# Patient Record
Sex: MALE | Race: OTHER | NOT HISPANIC OR LATINO | URBAN - METROPOLITAN AREA
[De-identification: names, ages, dates, MRNs, and addresses within clinical notes are randomized per-mention and may not be internally consistent; named-entity substitution may affect disease eponyms.]

---

## 2017-09-02 ENCOUNTER — EMERGENCY (EMERGENCY)
Facility: HOSPITAL | Age: 72
LOS: 0 days | Discharge: HOME | End: 2017-09-02
Attending: EMERGENCY MEDICINE
Payer: COMMERCIAL

## 2017-09-02 VITALS
HEART RATE: 81 BPM | RESPIRATION RATE: 20 BRPM | SYSTOLIC BLOOD PRESSURE: 130 MMHG | HEIGHT: 70 IN | WEIGHT: 132.06 LBS | OXYGEN SATURATION: 98 % | DIASTOLIC BLOOD PRESSURE: 82 MMHG | TEMPERATURE: 98 F

## 2017-09-02 DIAGNOSIS — Z90.49 ACQUIRED ABSENCE OF OTHER SPECIFIED PARTS OF DIGESTIVE TRACT: Chronic | ICD-10-CM

## 2017-09-02 PROCEDURE — 99283 EMERGENCY DEPT VISIT LOW MDM: CPT

## 2017-09-02 RX ORDER — ACETAMINOPHEN 500 MG
650 TABLET ORAL ONCE
Qty: 0 | Refills: 0 | Status: COMPLETED | OUTPATIENT
Start: 2017-09-02 | End: 2017-09-02

## 2017-09-02 RX ORDER — BACLOFEN 100 %
1 POWDER (GRAM) MISCELLANEOUS
Qty: 20 | Refills: 0
Start: 2017-09-02 | End: 2017-09-12

## 2017-09-02 RX ADMIN — Medication 650 MILLIGRAM(S): at 16:32

## 2017-09-02 NOTE — ED PROVIDER NOTE - MUSCULOSKELETAL, MLM
Spine appears normal, range of motion is not limited, + bilateral paravertebral mid lumbar and distal thoracic tenderness
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
## 2017-09-03 DIAGNOSIS — M54.5 LOW BACK PAIN: ICD-10-CM
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
## 2017-09-03 DIAGNOSIS — S46.911A STRAIN OF UNSPECIFIED MUSCLE, FASCIA AND TENDON AT SHOULDER AND UPPER ARM LEVEL, RIGHT ARM, INITIAL ENCOUNTER: ICD-10-CM

## 2017-09-03 DIAGNOSIS — S39.012A STRAIN OF MUSCLE, FASCIA AND TENDON OF LOWER BACK, INITIAL ENCOUNTER: ICD-10-CM

## 2017-09-03 DIAGNOSIS — S46.912A STRAIN OF UNSPECIFIED MUSCLE, FASCIA AND TENDON AT SHOULDER AND UPPER ARM LEVEL, LEFT ARM, INITIAL ENCOUNTER: ICD-10-CM

## 2018-02-24 ENCOUNTER — EMERGENCY (EMERGENCY)
Facility: HOSPITAL | Age: 73
LOS: 1 days | Discharge: HOME | End: 2018-02-24
Attending: EMERGENCY MEDICINE | Admitting: EMERGENCY MEDICINE
Payer: MEDICARE

## 2018-02-24 VITALS
OXYGEN SATURATION: 100 % | TEMPERATURE: 98 F | HEART RATE: 81 BPM | DIASTOLIC BLOOD PRESSURE: 75 MMHG | RESPIRATION RATE: 17 BRPM | SYSTOLIC BLOOD PRESSURE: 135 MMHG

## 2018-02-24 VITALS
TEMPERATURE: 98 F | DIASTOLIC BLOOD PRESSURE: 72 MMHG | RESPIRATION RATE: 18 BRPM | HEART RATE: 79 BPM | SYSTOLIC BLOOD PRESSURE: 113 MMHG | OXYGEN SATURATION: 100 %

## 2018-02-24 DIAGNOSIS — Z90.49 ACQUIRED ABSENCE OF OTHER SPECIFIED PARTS OF DIGESTIVE TRACT: Chronic | ICD-10-CM

## 2018-02-24 LAB
ALBUMIN SERPL ELPH-MCNC: 3.9 G/DL — SIGNIFICANT CHANGE UP (ref 3.3–5)
ALP SERPL-CCNC: 64 U/L — SIGNIFICANT CHANGE UP (ref 40–120)
ALT FLD-CCNC: 268 U/L — HIGH (ref 4–41)
AST SERPL-CCNC: 62 U/L — HIGH (ref 4–40)
BASOPHILS # BLD AUTO: 0.01 K/UL — SIGNIFICANT CHANGE UP (ref 0–0.2)
BASOPHILS NFR BLD AUTO: 0.2 % — SIGNIFICANT CHANGE UP (ref 0–2)
BILIRUB SERPL-MCNC: 1.1 MG/DL — SIGNIFICANT CHANGE UP (ref 0.2–1.2)
BUN SERPL-MCNC: 17 MG/DL — SIGNIFICANT CHANGE UP (ref 7–23)
CALCIUM SERPL-MCNC: 8.2 MG/DL — LOW (ref 8.4–10.5)
CHLORIDE SERPL-SCNC: 107 MMOL/L — SIGNIFICANT CHANGE UP (ref 98–107)
CO2 SERPL-SCNC: 20 MMOL/L — LOW (ref 22–31)
CREAT SERPL-MCNC: 0.73 MG/DL — SIGNIFICANT CHANGE UP (ref 0.5–1.3)
EOSINOPHIL # BLD AUTO: 0.06 K/UL — SIGNIFICANT CHANGE UP (ref 0–0.5)
EOSINOPHIL NFR BLD AUTO: 1.4 % — SIGNIFICANT CHANGE UP (ref 0–6)
GLUCOSE SERPL-MCNC: 106 MG/DL — HIGH (ref 70–99)
HCT VFR BLD CALC: 44 % — SIGNIFICANT CHANGE UP (ref 39–50)
HGB BLD-MCNC: 15.2 G/DL — SIGNIFICANT CHANGE UP (ref 13–17)
IMM GRANULOCYTES # BLD AUTO: 0.01 # — SIGNIFICANT CHANGE UP
IMM GRANULOCYTES NFR BLD AUTO: 0.2 % — SIGNIFICANT CHANGE UP (ref 0–1.5)
LIDOCAIN IGE QN: 87.6 U/L — HIGH (ref 7–60)
LYMPHOCYTES # BLD AUTO: 1.12 K/UL — SIGNIFICANT CHANGE UP (ref 1–3.3)
LYMPHOCYTES # BLD AUTO: 26.7 % — SIGNIFICANT CHANGE UP (ref 13–44)
MCHC RBC-ENTMCNC: 30.3 PG — SIGNIFICANT CHANGE UP (ref 27–34)
MCHC RBC-ENTMCNC: 34.5 % — SIGNIFICANT CHANGE UP (ref 32–36)
MCV RBC AUTO: 87.8 FL — SIGNIFICANT CHANGE UP (ref 80–100)
MONOCYTES # BLD AUTO: 0.43 K/UL — SIGNIFICANT CHANGE UP (ref 0–0.9)
MONOCYTES NFR BLD AUTO: 10.3 % — SIGNIFICANT CHANGE UP (ref 2–14)
NEUTROPHILS # BLD AUTO: 2.56 K/UL — SIGNIFICANT CHANGE UP (ref 1.8–7.4)
NEUTROPHILS NFR BLD AUTO: 61.2 % — SIGNIFICANT CHANGE UP (ref 43–77)
NRBC # FLD: 0 — SIGNIFICANT CHANGE UP
PLATELET # BLD AUTO: 154 K/UL — SIGNIFICANT CHANGE UP (ref 150–400)
PMV BLD: 11.6 FL — SIGNIFICANT CHANGE UP (ref 7–13)
POTASSIUM SERPL-MCNC: 4.1 MMOL/L — SIGNIFICANT CHANGE UP (ref 3.5–5.3)
POTASSIUM SERPL-SCNC: 4.1 MMOL/L — SIGNIFICANT CHANGE UP (ref 3.5–5.3)
PROT SERPL-MCNC: 6.6 G/DL — SIGNIFICANT CHANGE UP (ref 6–8.3)
RBC # BLD: 5.01 M/UL — SIGNIFICANT CHANGE UP (ref 4.2–5.8)
RBC # FLD: 12.8 % — SIGNIFICANT CHANGE UP (ref 10.3–14.5)
SODIUM SERPL-SCNC: 140 MMOL/L — SIGNIFICANT CHANGE UP (ref 135–145)
WBC # BLD: 4.19 K/UL — SIGNIFICANT CHANGE UP (ref 3.8–10.5)
WBC # FLD AUTO: 4.19 K/UL — SIGNIFICANT CHANGE UP (ref 3.8–10.5)

## 2018-02-24 PROCEDURE — 99284 EMERGENCY DEPT VISIT MOD MDM: CPT | Mod: 25,GC

## 2018-02-24 RX ORDER — SODIUM CHLORIDE 9 MG/ML
1000 INJECTION INTRAMUSCULAR; INTRAVENOUS; SUBCUTANEOUS ONCE
Qty: 0 | Refills: 0 | Status: COMPLETED | OUTPATIENT
Start: 2018-02-24 | End: 2018-02-24

## 2018-02-24 RX ORDER — ACETAMINOPHEN 500 MG
1000 TABLET ORAL ONCE
Qty: 0 | Refills: 0 | Status: DISCONTINUED | OUTPATIENT
Start: 2018-02-24 | End: 2018-02-24

## 2018-02-24 RX ORDER — ONDANSETRON 8 MG/1
4 TABLET, FILM COATED ORAL ONCE
Qty: 0 | Refills: 0 | Status: COMPLETED | OUTPATIENT
Start: 2018-02-24 | End: 2018-02-24

## 2018-02-24 RX ORDER — ONDANSETRON 8 MG/1
1 TABLET, FILM COATED ORAL
Qty: 6 | Refills: 0
Start: 2018-02-24 | End: 2018-02-25

## 2018-02-24 RX ORDER — ONDANSETRON 8 MG/1
4 TABLET, FILM COATED ORAL ONCE
Qty: 0 | Refills: 0 | Status: DISCONTINUED | OUTPATIENT
Start: 2018-02-24 | End: 2018-02-24

## 2018-02-24 RX ORDER — FAMOTIDINE 10 MG/ML
1 INJECTION INTRAVENOUS
Qty: 6 | Refills: 0
Start: 2018-02-24 | End: 2018-02-26

## 2018-02-24 RX ORDER — FAMOTIDINE 10 MG/ML
20 INJECTION INTRAVENOUS ONCE
Qty: 0 | Refills: 0 | Status: DISCONTINUED | OUTPATIENT
Start: 2018-02-24 | End: 2018-02-24

## 2018-02-24 RX ORDER — FAMOTIDINE 10 MG/ML
20 INJECTION INTRAVENOUS ONCE
Qty: 0 | Refills: 0 | Status: COMPLETED | OUTPATIENT
Start: 2018-02-24 | End: 2018-02-24

## 2018-02-24 RX ORDER — ACETAMINOPHEN 500 MG
650 TABLET ORAL ONCE
Qty: 0 | Refills: 0 | Status: COMPLETED | OUTPATIENT
Start: 2018-02-24 | End: 2018-02-24

## 2018-02-24 RX ADMIN — Medication 30 MILLILITER(S): at 08:27

## 2018-02-24 RX ADMIN — SODIUM CHLORIDE 1000 MILLILITER(S): 9 INJECTION INTRAMUSCULAR; INTRAVENOUS; SUBCUTANEOUS at 08:07

## 2018-02-24 RX ADMIN — FAMOTIDINE 20 MILLIGRAM(S): 10 INJECTION INTRAVENOUS at 08:27

## 2018-02-24 RX ADMIN — ONDANSETRON 4 MILLIGRAM(S): 8 TABLET, FILM COATED ORAL at 08:27

## 2018-02-24 RX ADMIN — SODIUM CHLORIDE 1000 MILLILITER(S): 9 INJECTION INTRAMUSCULAR; INTRAVENOUS; SUBCUTANEOUS at 10:14

## 2018-02-24 RX ADMIN — Medication 650 MILLIGRAM(S): at 11:41
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
## 2018-02-24 NOTE — ED PROVIDER NOTE - ATTENDING CONTRIBUTION TO CARE
agree with resident note  72 yr old male with no sig PMH presents with 5 days of N/V/D and mild epigastric pain.  Had visited wife who works in hospital and wife states hsopital was in "lockdown" due to stomach bug.  SHe had similar symptoms as  which has resolved for her.    PE: well appearing; VSS; CTAB/L; s1 s2 no m/r/g abd soft/ mild tenderness in epigastrum /ND ext: no edema    Imp: likely AGE but given age and mild epigastric tenderness will get CT

## 2018-02-24 NOTE — ED ADULT TRIAGE NOTE - CHIEF COMPLAINT QUOTE
pt. BIBA for vomiting and diarrhea x 5 days (non-bloody/non-bilious), intermittent mid-abd'l pain, and 1episode of subjective fever last Wednesday. pt. was seen by MD last Tuesday and was prescribed with Zantac & Zofran but no relief. denies any PMHx.

## 2018-02-24 NOTE — ED ADULT NURSE REASSESSMENT NOTE - NS ED NURSE REASSESS COMMENT FT1
Report received from BILL Sanchez at 0800. Pt received awake, alert, oriented x4. Pt reporting lightheadedness/dizziness and nausea; no active vomiting observed at this time. Pt reporting 'abdominal discomfort'. 20g PIV in R AC in place, dressing CDI with NS bolus infusing per orders. Pt medicated per orders. Visitor at bedside identified as wife. Pt with no apparent acute distress observed at this time. Safety maintained. Will continue to monitor.

## 2020-04-18 NOTE — ED ADULT NURSE NOTE - AS O2 DELIVERY
Patient returned to room.  Patient was not able to get into ride outside with 3 nurses and 2 other hospital personal.       Viola Shankar RN  04/18/20 2040     room air

## 2020-11-02 ENCOUNTER — EMERGENCY (EMERGENCY)
Facility: HOSPITAL | Age: 75
LOS: 1 days | Discharge: HOME | End: 2020-11-02
Attending: EMERGENCY MEDICINE | Admitting: EMERGENCY MEDICINE
Payer: MEDICARE

## 2020-11-02 VITALS
HEART RATE: 74 BPM | TEMPERATURE: 98 F | RESPIRATION RATE: 16 BRPM | HEIGHT: 70 IN | SYSTOLIC BLOOD PRESSURE: 164 MMHG | OXYGEN SATURATION: 100 % | DIASTOLIC BLOOD PRESSURE: 84 MMHG

## 2020-11-02 VITALS
RESPIRATION RATE: 18 BRPM | SYSTOLIC BLOOD PRESSURE: 160 MMHG | OXYGEN SATURATION: 99 % | DIASTOLIC BLOOD PRESSURE: 94 MMHG | HEART RATE: 74 BPM | TEMPERATURE: 98 F

## 2020-11-02 DIAGNOSIS — Z90.49 ACQUIRED ABSENCE OF OTHER SPECIFIED PARTS OF DIGESTIVE TRACT: Chronic | ICD-10-CM

## 2020-11-02 LAB
ALBUMIN SERPL ELPH-MCNC: 4.1 G/DL — SIGNIFICANT CHANGE UP (ref 3.3–5)
ALP SERPL-CCNC: 63 U/L — SIGNIFICANT CHANGE UP (ref 40–120)
ALT FLD-CCNC: 25 U/L — SIGNIFICANT CHANGE UP (ref 4–41)
ANION GAP SERPL CALC-SCNC: 9 MMO/L — SIGNIFICANT CHANGE UP (ref 7–14)
AST SERPL-CCNC: 20 U/L — SIGNIFICANT CHANGE UP (ref 4–40)
BASOPHILS # BLD AUTO: 0.03 K/UL — SIGNIFICANT CHANGE UP (ref 0–0.2)
BASOPHILS NFR BLD AUTO: 0.6 % — SIGNIFICANT CHANGE UP (ref 0–2)
BILIRUB SERPL-MCNC: 0.6 MG/DL — SIGNIFICANT CHANGE UP (ref 0.2–1.2)
BUN SERPL-MCNC: 17 MG/DL — SIGNIFICANT CHANGE UP (ref 7–23)
CALCIUM SERPL-MCNC: 8.9 MG/DL — SIGNIFICANT CHANGE UP (ref 8.4–10.5)
CHLORIDE SERPL-SCNC: 106 MMOL/L — SIGNIFICANT CHANGE UP (ref 98–107)
CO2 SERPL-SCNC: 25 MMOL/L — SIGNIFICANT CHANGE UP (ref 22–31)
CREAT SERPL-MCNC: 0.79 MG/DL — SIGNIFICANT CHANGE UP (ref 0.5–1.3)
EOSINOPHIL # BLD AUTO: 0.25 K/UL — SIGNIFICANT CHANGE UP (ref 0–0.5)
EOSINOPHIL NFR BLD AUTO: 4.7 % — SIGNIFICANT CHANGE UP (ref 0–6)
GLUCOSE SERPL-MCNC: 105 MG/DL — HIGH (ref 70–99)
HCT VFR BLD CALC: 42.2 % — SIGNIFICANT CHANGE UP (ref 39–50)
HGB BLD-MCNC: 14.2 G/DL — SIGNIFICANT CHANGE UP (ref 13–17)
IMM GRANULOCYTES NFR BLD AUTO: 0.2 % — SIGNIFICANT CHANGE UP (ref 0–1.5)
LIDOCAIN IGE QN: 104.8 U/L — HIGH (ref 7–60)
LYMPHOCYTES # BLD AUTO: 1.78 K/UL — SIGNIFICANT CHANGE UP (ref 1–3.3)
LYMPHOCYTES # BLD AUTO: 33.5 % — SIGNIFICANT CHANGE UP (ref 13–44)
MCHC RBC-ENTMCNC: 29.9 PG — SIGNIFICANT CHANGE UP (ref 27–34)
MCHC RBC-ENTMCNC: 33.6 % — SIGNIFICANT CHANGE UP (ref 32–36)
MCV RBC AUTO: 88.8 FL — SIGNIFICANT CHANGE UP (ref 80–100)
MONOCYTES # BLD AUTO: 0.58 K/UL — SIGNIFICANT CHANGE UP (ref 0–0.9)
MONOCYTES NFR BLD AUTO: 10.9 % — SIGNIFICANT CHANGE UP (ref 2–14)
NEUTROPHILS # BLD AUTO: 2.67 K/UL — SIGNIFICANT CHANGE UP (ref 1.8–7.4)
NEUTROPHILS NFR BLD AUTO: 50.1 % — SIGNIFICANT CHANGE UP (ref 43–77)
NRBC # FLD: 0.02 K/UL — SIGNIFICANT CHANGE UP (ref 0–0)
PLATELET # BLD AUTO: 170 K/UL — SIGNIFICANT CHANGE UP (ref 150–400)
PMV BLD: 11.3 FL — SIGNIFICANT CHANGE UP (ref 7–13)
POTASSIUM SERPL-MCNC: 4.1 MMOL/L — SIGNIFICANT CHANGE UP (ref 3.5–5.3)
POTASSIUM SERPL-SCNC: 4.1 MMOL/L — SIGNIFICANT CHANGE UP (ref 3.5–5.3)
PROT SERPL-MCNC: 7.1 G/DL — SIGNIFICANT CHANGE UP (ref 6–8.3)
RBC # BLD: 4.75 M/UL — SIGNIFICANT CHANGE UP (ref 4.2–5.8)
RBC # FLD: 13.3 % — SIGNIFICANT CHANGE UP (ref 10.3–14.5)
SODIUM SERPL-SCNC: 140 MMOL/L — SIGNIFICANT CHANGE UP (ref 135–145)
TROPONIN T, HIGH SENSITIVITY: < 6 NG/L — SIGNIFICANT CHANGE UP (ref ?–14)
WBC # BLD: 5.32 K/UL — SIGNIFICANT CHANGE UP (ref 3.8–10.5)
WBC # FLD AUTO: 5.32 K/UL — SIGNIFICANT CHANGE UP (ref 3.8–10.5)

## 2020-11-02 PROCEDURE — 71045 X-RAY EXAM CHEST 1 VIEW: CPT | Mod: 26

## 2020-11-02 PROCEDURE — 93010 ELECTROCARDIOGRAM REPORT: CPT

## 2020-11-02 PROCEDURE — 71275 CT ANGIOGRAPHY CHEST: CPT | Mod: 26

## 2020-11-02 PROCEDURE — 74174 CTA ABD&PLVS W/CONTRAST: CPT | Mod: 26

## 2020-11-02 PROCEDURE — 99285 EMERGENCY DEPT VISIT HI MDM: CPT | Mod: 25,GC

## 2020-11-02 RX ORDER — FAMOTIDINE 10 MG/ML
20 INJECTION INTRAVENOUS ONCE
Refills: 0 | Status: COMPLETED | OUTPATIENT
Start: 2020-11-02 | End: 2020-11-02

## 2020-11-02 RX ADMIN — Medication 30 MILLILITER(S): at 17:59

## 2020-11-02 RX ADMIN — FAMOTIDINE 20 MILLIGRAM(S): 10 INJECTION INTRAVENOUS at 17:59

## 2020-11-02 NOTE — ED PROVIDER NOTE - CLINICAL SUMMARY MEDICAL DECISION MAKING FREE TEXT BOX
75yo male with no known pmh presenting with back pain, chest pain, epigastric pain.  Labs to r/o acs, pancreatitis.  EKG NSR.  CXR.  Patient hypertensive here, denies prior history but also smoker.  Will get CTA to r/o dissection and reassess.

## 2020-11-02 NOTE — ED PROVIDER NOTE - PATIENT PORTAL LINK FT
You can access the FollowMyHealth Patient Portal offered by [facility] by registering at the following website: [email or web address]. By joining [employer]’s FollowMyHealth portal, you will also be able to view your health information using other applications (apps) compatible with our system.

## 2020-11-02 NOTE — ED PROVIDER NOTE - PROGRESS NOTE DETAILS
Justin, PGY1 – Pt was re-evaluated at bedside, VSS, feeling well overall. Return precautions and follow up plan with PCP and/or specialist were discussed. Time was taken to answer any questions that the patient had before providing them with discharge paperwork.

## 2020-11-02 NOTE — ED PROVIDER NOTE - OBJECTIVE STATEMENT
73yo male with no known pmh presenting with back pain, chest pain, epigastric pain.  Patient states pain started 3 days ago and has been constant, progressively worsening.  States pain is primarily from back between shoulder blades radiating to chest, abdomen.  Denies cardiac history.  30 pack year history.  No fevers, cough.  Admit to shortness of breath.  No numbness, weakness, difficulty ambulating.

## 2020-11-02 NOTE — ED ADULT NURSE NOTE - OBJECTIVE STATEMENT
Break coverage- Pt received into spot #12. AAOx4, c/o b/l shoulder pain and back pain radiating to his chest. Pt c/o achy feeling to his chest. c/o SOB with exertion. Respiratory even and unlabored at this time. NSR on cardiac monitor. Denies dizziness. Denies n/v. Pt states he tested positive for COVID back in April. MD holley being performed at bedside. no acute distress. Awaiting further plan of care. Break coverage- Pt received into spot #12. AAOx4, c/o b/l shoulder pain and back pain radiating to his chest. Pt c/o achy feeling to his chest. c/o SOB with exertion. Respiratory even and unlabored at this time. NSR on cardiac monitor. Denies dizziness. Denies n/v. Pt states he tested positive for COVID back in April. MD holley being performed at bedside. #20g IVSL placed to left ac, labs drawn and sent. no acute distress. Awaiting further plan of care.

## 2020-11-02 NOTE — ED ADULT TRIAGE NOTE - CHIEF COMPLAINT QUOTE
pt here for back pain radiating into the chest and shortness of breath x 3 days. took Tylenol and aspirin with no relief pmhx: covid 4/20

## 2020-11-02 NOTE — ED PROVIDER NOTE - NSFOLLOWUPINSTRUCTIONS_ED_ALL_ED_FT
You were seen for back pain.     Back pain is very common in adults. The cause of back pain is rarely dangerous and the pain often gets better over time. The cause of your back pain may not be known and may include strain of muscles or ligaments, degeneration of the spinal disks, or arthritis. Occasionally the pain may radiate down your leg(s). Over-the-counter medicines to reduce pain and inflammation are often the most helpful. Stretching and remaining active frequently helps the healing process.     Back Strain  A strain is a stretch or tear in a muscle or the strong cords of tissue that attach muscle to bone (tendons). Strains of the lower back (lumbar spine) are a common cause of low back pain. A strain occurs when muscles or tendons are torn or are stretched beyond their limits. The muscles may become inflamed, resulting in pain and sudden muscle tightening (spasms). A strain can happen suddenly due to an injury (trauma), or it can develop gradually due to overuse.    What increases the risk?  The following factors may increase your risk of getting this condition:  Playing contact sports.  Participating in sports or activities that put excessive stress on the back and require a lot of bending and twisting, including:  Lifting weights or heavy objects, Gymnastics, Soccer, Figure skating, Snowboarding, Being overweight or obese, Having poor strength and flexibility.    What are the signs or symptoms?  Symptoms of this condition may include:  Sharp or dull pain in the lower back that does not go away. Pain may extend to the buttocks.  Stiffness.  Limited range of motion.  Inability to stand up straight due to stiffness or pain.  Muscle spasms.    How is this diagnosed?  This condition may be diagnosed based on:  Your symptoms, Your medical history, A physical exam, Your health care provider may push on certain areas of your back to determine the source of your pain. You may be asked to bend forward, backward, and side to side to assess the severity of your pain and your range of motion.  Imaging tests, such as:  X-rays, MRI.    How is this treated?  Treatment for this condition may include:  Applying heat and cold to the affected area.  Medicines to help relieve pain and to relax your muscles (muscle relaxants).  NSAIDs to help reduce swelling and discomfort.  Physical therapy.  When your symptoms improve, it is important to gradually return to your normal routine as soon as possible to reduce pain, avoid stiffness, and avoid loss of muscle strength. Generally, symptoms should improve within 6 weeks of treatment. However, recovery time varies.    Follow these instructions at home:  Managing pain, stiffness, and swelling     If directed, apply ice to the injured area during the first 24 hours after your injury.  Put ice in a plastic bag.  Place a towel between your skin and the bag.  Leave the ice on for 20 minutes, 2–3 times a day.  If directed, apply heat to the affected area as often as told by your health care provider. Use the heat source that your health care provider recommends, such as a moist heat pack or a heating pad.  Place a towel between your skin and the heat source.  Leave the heat on for 20–30 minutes.  Remove the heat if your skin turns bright red. This is especially important if you are unable to feel pain, heat, or cold. You may have a greater risk of getting burned.  Activity     Rest and return to your normal activities as told by your health care provider. Ask your health care provider what activities are safe for you.  Avoid activities that take a lot of effort (are strenuous) for as long as told by your health care provider.  Do exercises as told by your health care provider.  General instructions     Take over-the-counter and prescription medicines only as told by your health care provider.  If you have questions or concerns about safety while taking pain medicine, talk with your health care provider.    Do not drive or operate heavy machinery until you know how your pain medicine affects you.  Do not use any tobacco   products, such as cigarettes, chewing tobacco, and e-cigarettes. Tobacco can delay bone healing. If you need help quitting, ask your health care provider.    Keep all follow-up visits as told by your health care provider. This is important.  How is this prevented?    Warm up and stretch before being active.  Cool down and stretch after being active.  Give your body time to rest between periods of activity.  Avoid:  Being physically inactive for long periods at a time.  Exercising or playing sports when you are tired or in pain.  Use correct form when playing sports and lifting heavy objects.  Use good posture when sitting and standing.  Maintain a healthy weight.  Sleep on a mattress with medium firmness to support your back.  Make sure to use equipment that fits you, including shoes that fit well.  Be safe and responsible while being active to avoid falls.  Do at least 150 minutes of moderate-intensity exercise each week, such as brisk walking or water aerobics. Try a form of exercise that takes stress off your back, such as swimming or stationary cycling.  Maintain physical fitness, including:  Strength.  Flexibility.  Cardiovascular fitness.  Endurance.  Contact a health care provider if:  Your back pain does not improve after 6 weeks of treatment.  Your symptoms get worse.  Get help right away if:  Your back pain is severe.  You are unable to stand or walk.  You develop pain in your legs.  You develop weakness in your buttocks or legs.  You have difficulty controlling when you urinate or when you have a bowel movement.  This information is not intended to replace advice given to you by your health care provider. Make sure you discuss any questions you have with your health care provider.      SEEK IMMEDIATE MEDICAL CARE IF YOU HAVE ANY OF THE FOLLOWING SYMPTOMS: bowel or bladder control problems, unusual weakness or numbness in your arms or legs, nausea or vomiting, abdominal pain, fever, dizziness/lightheadedness.
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
## 2020-11-02 NOTE — ED PROVIDER NOTE - PHYSICAL EXAMINATION
General appearance: NAD, conversant, afebrile    Neck: Trachea midline; Full range of motion, supple   Pulm: CTA bl, normal respiratory effort and no intercostal retractions, normal work of breathing   CV: RRR, No murmurs, rubs, or gallops   Abdomen: Soft, epigastric tenderness, non-distended; no guarding or rebound   Extremities: No peripheral edema    Skin: Dry, normal temperature, turgor and texture; no rash   Psych: Appropriate affect, cooperative; alert and oriented to person, place and time

## 2020-11-02 NOTE — ED ADULT NURSE REASSESSMENT NOTE - NS ED NURSE REASSESS COMMENT FT1
Received report from BILL Morillo. Pt A&Ox4, appears comfortable. Pt endorsing CP radiating to back in between shoulder blades x 3 days, states pain is tolerable at this time. RR even and unlabored. NSR on cardiac monitor. Will continue to monitor.

## 2020-11-11 ENCOUNTER — APPOINTMENT (OUTPATIENT)
Dept: HEART AND VASCULAR | Facility: CLINIC | Age: 75
End: 2020-11-11
Payer: MEDICARE

## 2020-11-11 ENCOUNTER — NON-APPOINTMENT (OUTPATIENT)
Age: 75
End: 2020-11-11

## 2020-11-11 VITALS
HEIGHT: 69 IN | TEMPERATURE: 98.4 F | OXYGEN SATURATION: 98 % | SYSTOLIC BLOOD PRESSURE: 146 MMHG | DIASTOLIC BLOOD PRESSURE: 80 MMHG | HEART RATE: 80 BPM | WEIGHT: 136 LBS | BODY MASS INDEX: 20.14 KG/M2

## 2020-11-11 DIAGNOSIS — Z82.49 FAMILY HISTORY OF ISCHEMIC HEART DISEASE AND OTHER DISEASES OF THE CIRCULATORY SYSTEM: ICD-10-CM

## 2020-11-11 DIAGNOSIS — Z78.9 OTHER SPECIFIED HEALTH STATUS: ICD-10-CM

## 2020-11-11 DIAGNOSIS — Z83.3 FAMILY HISTORY OF DIABETES MELLITUS: ICD-10-CM

## 2020-11-11 DIAGNOSIS — Z87.891 PERSONAL HISTORY OF NICOTINE DEPENDENCE: ICD-10-CM

## 2020-11-11 DIAGNOSIS — Z80.9 FAMILY HISTORY OF MALIGNANT NEOPLASM, UNSPECIFIED: ICD-10-CM

## 2020-11-11 PROBLEM — Z00.00 ENCOUNTER FOR PREVENTIVE HEALTH EXAMINATION: Status: ACTIVE | Noted: 2020-11-11

## 2020-11-11 PROCEDURE — 99072 ADDL SUPL MATRL&STAF TM PHE: CPT

## 2020-11-11 PROCEDURE — 93000 ELECTROCARDIOGRAM COMPLETE: CPT

## 2020-11-11 PROCEDURE — 99203 OFFICE O/P NEW LOW 30 MIN: CPT

## 2020-11-11 PROCEDURE — 93306 TTE W/DOPPLER COMPLETE: CPT

## 2020-11-11 NOTE — HISTORY OF PRESENT ILLNESS
[FreeTextEntry1] : 74 M COVID former smoker quit 30 years ago smoked for 20 years self referred complaining of dull chest pain gets sob when he walks chest pain occurs at any time at rest pain feels like a "muscle" pain pain radiates to his back pain has gotten better but now is sob with walking now and then an "uncomfortable feeling in his chest \par \par ct chest done with plaque in the LAD \par \par \par ecg NSR \par \par

## 2020-11-11 NOTE — PHYSICAL EXAM
[General Appearance - Well Developed] : well developed [Normal Appearance] : normal appearance [Well Groomed] : well groomed [General Appearance - Well Nourished] : well nourished [No Deformities] : no deformities [General Appearance - In No Acute Distress] : no acute distress [Normal Conjunctiva] : the conjunctiva exhibited no abnormalities [Eyelids - No Xanthelasma] : the eyelids demonstrated no xanthelasmas [Normal Oral Mucosa] : normal oral mucosa [No Oral Pallor] : no oral pallor [No Oral Cyanosis] : no oral cyanosis [Normal Jugular Venous A Waves Present] : normal jugular venous A waves present [Normal Jugular Venous V Waves Present] : normal jugular venous V waves present [No Jugular Venous Quiñones A Waves] : no jugular venous quiñones A waves [Heart Rate And Rhythm] : heart rate and rhythm were normal [Heart Sounds] : normal S1 and S2 [Murmurs] : no murmurs present [Respiration, Rhythm And Depth] : normal respiratory rhythm and effort [Exaggerated Use Of Accessory Muscles For Inspiration] : no accessory muscle use [Auscultation Breath Sounds / Voice Sounds] : lungs were clear to auscultation bilaterally [Abdomen Soft] : soft [Abdomen Tenderness] : non-tender [Abdomen Mass (___ Cm)] : no abdominal mass palpated [Abnormal Walk] : normal gait [Gait - Sufficient For Exercise Testing] : the gait was sufficient for exercise testing [Nail Clubbing] : no clubbing of the fingernails [Cyanosis, Localized] : no localized cyanosis [Petechial Hemorrhages (___cm)] : no petechial hemorrhages [Skin Color & Pigmentation] : normal skin color and pigmentation [] : no rash [No Venous Stasis] : no venous stasis [Skin Lesions] : no skin lesions [No Skin Ulcers] : no skin ulcer [No Xanthoma] : no  xanthoma was observed [Oriented To Time, Place, And Person] : oriented to person, place, and time [Affect] : the affect was normal [Mood] : the mood was normal [No Anxiety] : not feeling anxious

## 2020-11-12 LAB
CHOLEST SERPL-MCNC: 234 MG/DL
CREAT SPEC-SCNC: 48 MG/DL
HDLC SERPL-MCNC: 70 MG/DL
LDLC SERPL CALC-MCNC: 106 MG/DL
MICROALBUMIN 24H UR DL<=1MG/L-MCNC: <1.2 MG/DL
MICROALBUMIN/CREAT 24H UR-RTO: NORMAL MG/G
NONHDLC SERPL-MCNC: 164 MG/DL
TRIGL SERPL-MCNC: 287 MG/DL

## 2020-11-20 ENCOUNTER — APPOINTMENT (OUTPATIENT)
Dept: CT IMAGING | Facility: CLINIC | Age: 75
End: 2020-11-20
Payer: MEDICARE

## 2020-11-20 ENCOUNTER — RESULT REVIEW (OUTPATIENT)
Age: 75
End: 2020-11-20

## 2020-11-20 ENCOUNTER — OUTPATIENT (OUTPATIENT)
Dept: OUTPATIENT SERVICES | Facility: HOSPITAL | Age: 75
LOS: 1 days | End: 2020-11-20

## 2020-11-20 DIAGNOSIS — Z90.49 ACQUIRED ABSENCE OF OTHER SPECIFIED PARTS OF DIGESTIVE TRACT: Chronic | ICD-10-CM

## 2020-11-20 PROCEDURE — 75574 CT ANGIO HRT W/3D IMAGE: CPT | Mod: 26

## 2020-11-24 ENCOUNTER — APPOINTMENT (OUTPATIENT)
Dept: HEART AND VASCULAR | Facility: CLINIC | Age: 75
End: 2020-11-24
Payer: MEDICARE

## 2020-11-24 VITALS
DIASTOLIC BLOOD PRESSURE: 66 MMHG | HEIGHT: 69 IN | BODY MASS INDEX: 19.85 KG/M2 | TEMPERATURE: 98.4 F | SYSTOLIC BLOOD PRESSURE: 120 MMHG | OXYGEN SATURATION: 99 % | WEIGHT: 134 LBS | HEART RATE: 67 BPM

## 2020-11-24 PROCEDURE — 99214 OFFICE O/P EST MOD 30 MIN: CPT

## 2020-11-24 RX ORDER — KRILL/OM-3/DHA/EPA/PHOSPHO/AST 1000-230MG
81 CAPSULE ORAL DAILY
Qty: 90 | Refills: 2 | Status: ACTIVE | COMMUNITY
Start: 2020-11-24 | End: 1900-01-01

## 2020-11-24 NOTE — HISTORY OF PRESENT ILLNESS
[FreeTextEntry1] : 74 M COVID former smoker quit 30 years ago smoked for 20 years self referred complaining of dull chest pain gets sob when he walks chest pain occurs at any time at rest pain feels like a "muscle" pain pain radiates to his back pain has gotten better but now is sob with walking now and then an "uncomfortable feeling in his chest CCTA with severe large ramus disease and moderate LAD still having symptoms despite med therapy \par \par \par \par ecg NSR \par \par

## 2020-11-24 NOTE — PHYSICAL EXAM
[General Appearance - Well Developed] : well developed [Normal Appearance] : normal appearance [Well Groomed] : well groomed [General Appearance - Well Nourished] : well nourished [No Deformities] : no deformities [General Appearance - In No Acute Distress] : no acute distress [Normal Conjunctiva] : the conjunctiva exhibited no abnormalities [Eyelids - No Xanthelasma] : the eyelids demonstrated no xanthelasmas [Normal Oral Mucosa] : normal oral mucosa [No Oral Pallor] : no oral pallor [No Oral Cyanosis] : no oral cyanosis [Normal Jugular Venous A Waves Present] : normal jugular venous A waves present [Normal Jugular Venous V Waves Present] : normal jugular venous V waves present [No Jugular Venous Quiñones A Waves] : no jugular venous quiñones A waves [Respiration, Rhythm And Depth] : normal respiratory rhythm and effort [Exaggerated Use Of Accessory Muscles For Inspiration] : no accessory muscle use [Auscultation Breath Sounds / Voice Sounds] : lungs were clear to auscultation bilaterally [Heart Rate And Rhythm] : heart rate and rhythm were normal [Heart Sounds] : normal S1 and S2 [Murmurs] : no murmurs present [Abdomen Soft] : soft [Abdomen Tenderness] : non-tender [Abdomen Mass (___ Cm)] : no abdominal mass palpated [Abnormal Walk] : normal gait [Gait - Sufficient For Exercise Testing] : the gait was sufficient for exercise testing [Nail Clubbing] : no clubbing of the fingernails [Cyanosis, Localized] : no localized cyanosis [Petechial Hemorrhages (___cm)] : no petechial hemorrhages [Skin Color & Pigmentation] : normal skin color and pigmentation [] : no rash [No Venous Stasis] : no venous stasis [Skin Lesions] : no skin lesions [No Skin Ulcers] : no skin ulcer [No Xanthoma] : no  xanthoma was observed [Oriented To Time, Place, And Person] : oriented to person, place, and time [Affect] : the affect was normal [Mood] : the mood was normal [No Anxiety] : not feeling anxious

## 2020-11-24 NOTE — ASSESSMENT
[FreeTextEntry1] : classic angina symptoms despite med therapy feels like he is not doing well will need cath does not want to go to ED he is agreeable to go later as it will take too long to get auth \par add asa 81 mg daily and plavix 75 mg daily\par fu after cath

## 2020-11-25 ENCOUNTER — INPATIENT (INPATIENT)
Facility: HOSPITAL | Age: 75
LOS: 0 days | Discharge: HOME | DRG: 247 | End: 2020-11-26
Attending: INTERNAL MEDICINE | Admitting: INTERNAL MEDICINE
Payer: MEDICARE

## 2020-11-25 VITALS
TEMPERATURE: 98 F | SYSTOLIC BLOOD PRESSURE: 163 MMHG | HEART RATE: 71 BPM | OXYGEN SATURATION: 99 % | HEIGHT: 70 IN | DIASTOLIC BLOOD PRESSURE: 93 MMHG | RESPIRATION RATE: 18 BRPM

## 2020-11-25 DIAGNOSIS — Z98.49 CATARACT EXTRACTION STATUS, UNSPECIFIED EYE: Chronic | ICD-10-CM

## 2020-11-25 DIAGNOSIS — Z90.49 ACQUIRED ABSENCE OF OTHER SPECIFIED PARTS OF DIGESTIVE TRACT: Chronic | ICD-10-CM

## 2020-11-25 DIAGNOSIS — L72.9 FOLLICULAR CYST OF THE SKIN AND SUBCUTANEOUS TISSUE, UNSPECIFIED: Chronic | ICD-10-CM

## 2020-11-25 LAB
A1C WITH ESTIMATED AVERAGE GLUCOSE RESULT: 5.2 % — SIGNIFICANT CHANGE UP (ref 4–5.6)
APTT BLD: 31.5 SEC — SIGNIFICANT CHANGE UP (ref 27.5–35.5)
BASOPHILS # BLD AUTO: 0.04 K/UL — SIGNIFICANT CHANGE UP (ref 0–0.2)
BASOPHILS NFR BLD AUTO: 0.7 % — SIGNIFICANT CHANGE UP (ref 0–2)
CHOLEST SERPL-MCNC: 124 MG/DL — SIGNIFICANT CHANGE UP
EOSINOPHIL # BLD AUTO: 0.14 K/UL — SIGNIFICANT CHANGE UP (ref 0–0.5)
EOSINOPHIL NFR BLD AUTO: 2.3 % — SIGNIFICANT CHANGE UP (ref 0–6)
ESTIMATED AVERAGE GLUCOSE: 103 MG/DL — SIGNIFICANT CHANGE UP (ref 68–114)
HCT VFR BLD CALC: 42.8 % — SIGNIFICANT CHANGE UP (ref 39–50)
HDLC SERPL-MCNC: 55 MG/DL — SIGNIFICANT CHANGE UP
HGB BLD-MCNC: 14.8 G/DL — SIGNIFICANT CHANGE UP (ref 13–17)
IMM GRANULOCYTES NFR BLD AUTO: 0.2 % — SIGNIFICANT CHANGE UP (ref 0–1.5)
INR BLD: 1.14 — SIGNIFICANT CHANGE UP (ref 0.88–1.16)
LIPID PNL WITH DIRECT LDL SERPL: 49 MG/DL — SIGNIFICANT CHANGE UP
LYMPHOCYTES # BLD AUTO: 1.68 K/UL — SIGNIFICANT CHANGE UP (ref 1–3.3)
LYMPHOCYTES # BLD AUTO: 28 % — SIGNIFICANT CHANGE UP (ref 13–44)
MCHC RBC-ENTMCNC: 31 PG — SIGNIFICANT CHANGE UP (ref 27–34)
MCHC RBC-ENTMCNC: 34.6 GM/DL — SIGNIFICANT CHANGE UP (ref 32–36)
MCV RBC AUTO: 89.5 FL — SIGNIFICANT CHANGE UP (ref 80–100)
MONOCYTES # BLD AUTO: 0.52 K/UL — SIGNIFICANT CHANGE UP (ref 0–0.9)
MONOCYTES NFR BLD AUTO: 8.7 % — SIGNIFICANT CHANGE UP (ref 2–14)
NEUTROPHILS # BLD AUTO: 3.6 K/UL — SIGNIFICANT CHANGE UP (ref 1.8–7.4)
NEUTROPHILS NFR BLD AUTO: 60.1 % — SIGNIFICANT CHANGE UP (ref 43–77)
NON HDL CHOLESTEROL: 69 MG/DL — SIGNIFICANT CHANGE UP
NRBC # BLD: 0 /100 WBCS — SIGNIFICANT CHANGE UP (ref 0–0)
PLATELET # BLD AUTO: 174 K/UL — SIGNIFICANT CHANGE UP (ref 150–400)
PROTHROM AB SERPL-ACNC: 13.6 SEC — SIGNIFICANT CHANGE UP (ref 10.6–13.6)
RBC # BLD: 4.78 M/UL — SIGNIFICANT CHANGE UP (ref 4.2–5.8)
RBC # FLD: 12.6 % — SIGNIFICANT CHANGE UP (ref 10.3–14.5)
SARS-COV-2 RNA SPEC QL NAA+PROBE: SIGNIFICANT CHANGE UP
TRIGL SERPL-MCNC: 99 MG/DL — SIGNIFICANT CHANGE UP
WBC # BLD: 5.99 K/UL — SIGNIFICANT CHANGE UP (ref 3.8–10.5)
WBC # FLD AUTO: 5.99 K/UL — SIGNIFICANT CHANGE UP (ref 3.8–10.5)

## 2020-11-25 PROCEDURE — 99222 1ST HOSP IP/OBS MODERATE 55: CPT

## 2020-11-25 PROCEDURE — 93458 L HRT ARTERY/VENTRICLE ANGIO: CPT | Mod: 26,59

## 2020-11-25 PROCEDURE — 92928 PRQ TCAT PLMT NTRAC ST 1 LES: CPT | Mod: RI

## 2020-11-25 PROCEDURE — 99285 EMERGENCY DEPT VISIT HI MDM: CPT

## 2020-11-25 PROCEDURE — 71046 X-RAY EXAM CHEST 2 VIEWS: CPT | Mod: 26

## 2020-11-25 PROCEDURE — 93306 TTE W/DOPPLER COMPLETE: CPT | Mod: 26

## 2020-11-25 RX ORDER — METOPROLOL TARTRATE 50 MG
0 TABLET ORAL
Qty: 0 | Refills: 0 | DISCHARGE

## 2020-11-25 RX ORDER — LANOLIN ALCOHOL/MO/W.PET/CERES
5 CREAM (GRAM) TOPICAL AT BEDTIME
Refills: 0 | Status: DISCONTINUED | OUTPATIENT
Start: 2020-11-25 | End: 2020-11-26

## 2020-11-25 RX ORDER — CHLORHEXIDINE GLUCONATE 213 G/1000ML
1 SOLUTION TOPICAL ONCE
Refills: 0 | Status: DISCONTINUED | OUTPATIENT
Start: 2020-11-25 | End: 2020-11-26

## 2020-11-25 RX ORDER — CLOPIDOGREL BISULFATE 75 MG/1
0 TABLET, FILM COATED ORAL
Qty: 0 | Refills: 0 | DISCHARGE

## 2020-11-25 RX ORDER — METOPROLOL TARTRATE 50 MG
25 TABLET ORAL EVERY 24 HOURS
Refills: 0 | Status: DISCONTINUED | OUTPATIENT
Start: 2020-11-25 | End: 2020-11-25

## 2020-11-25 RX ORDER — ASPIRIN/CALCIUM CARB/MAGNESIUM 324 MG
81 TABLET ORAL DAILY
Refills: 0 | Status: DISCONTINUED | OUTPATIENT
Start: 2020-11-25 | End: 2020-11-26

## 2020-11-25 RX ORDER — CLOPIDOGREL BISULFATE 75 MG/1
600 TABLET, FILM COATED ORAL ONCE
Refills: 0 | Status: COMPLETED | OUTPATIENT
Start: 2020-11-25 | End: 2020-11-25

## 2020-11-25 RX ORDER — NITROGLYCERIN 6.5 MG
0.4 CAPSULE, EXTENDED RELEASE ORAL ONCE
Refills: 0 | Status: COMPLETED | OUTPATIENT
Start: 2020-11-25 | End: 2020-11-25

## 2020-11-25 RX ORDER — LOSARTAN POTASSIUM 100 MG/1
0 TABLET, FILM COATED ORAL
Qty: 0 | Refills: 0 | DISCHARGE

## 2020-11-25 RX ORDER — METOPROLOL TARTRATE 50 MG
1 TABLET ORAL
Qty: 0 | Refills: 0 | DISCHARGE

## 2020-11-25 RX ORDER — TICAGRELOR 90 MG/1
180 TABLET ORAL ONCE
Refills: 0 | Status: COMPLETED | OUTPATIENT
Start: 2020-11-25 | End: 2020-11-25

## 2020-11-25 RX ORDER — ASPIRIN/CALCIUM CARB/MAGNESIUM 324 MG
324 TABLET ORAL ONCE
Refills: 0 | Status: COMPLETED | OUTPATIENT
Start: 2020-11-25 | End: 2020-11-25

## 2020-11-25 RX ORDER — ASPIRIN/CALCIUM CARB/MAGNESIUM 324 MG
243 TABLET ORAL ONCE
Refills: 0 | Status: DISCONTINUED | OUTPATIENT
Start: 2020-11-25 | End: 2020-11-25

## 2020-11-25 RX ORDER — SODIUM CHLORIDE 9 MG/ML
500 INJECTION INTRAMUSCULAR; INTRAVENOUS; SUBCUTANEOUS
Refills: 0 | Status: DISCONTINUED | OUTPATIENT
Start: 2020-11-25 | End: 2020-11-25

## 2020-11-25 RX ORDER — CLOPIDOGREL BISULFATE 75 MG/1
75 TABLET, FILM COATED ORAL DAILY
Refills: 0 | Status: DISCONTINUED | OUTPATIENT
Start: 2020-11-25 | End: 2020-11-25

## 2020-11-25 RX ORDER — LOSARTAN POTASSIUM 100 MG/1
50 TABLET, FILM COATED ORAL DAILY
Refills: 0 | Status: DISCONTINUED | OUTPATIENT
Start: 2020-11-26 | End: 2020-11-26

## 2020-11-25 RX ORDER — METOPROLOL TARTRATE 50 MG
25 TABLET ORAL DAILY
Refills: 0 | Status: DISCONTINUED | OUTPATIENT
Start: 2020-11-25 | End: 2020-11-26

## 2020-11-25 RX ORDER — SIMETHICONE 80 MG/1
80 TABLET, CHEWABLE ORAL ONCE
Refills: 0 | Status: COMPLETED | OUTPATIENT
Start: 2020-11-25 | End: 2020-11-25

## 2020-11-25 RX ORDER — ACETAMINOPHEN 500 MG
650 TABLET ORAL ONCE
Refills: 0 | Status: COMPLETED | OUTPATIENT
Start: 2020-11-25 | End: 2020-11-25

## 2020-11-25 RX ORDER — LOSARTAN POTASSIUM 100 MG/1
1 TABLET, FILM COATED ORAL
Qty: 0 | Refills: 0 | DISCHARGE

## 2020-11-25 RX ORDER — ATORVASTATIN CALCIUM 80 MG/1
80 TABLET, FILM COATED ORAL AT BEDTIME
Refills: 0 | Status: DISCONTINUED | OUTPATIENT
Start: 2020-11-25 | End: 2020-11-26

## 2020-11-25 RX ORDER — TICAGRELOR 90 MG/1
90 TABLET ORAL EVERY 12 HOURS
Refills: 0 | Status: DISCONTINUED | OUTPATIENT
Start: 2020-11-26 | End: 2020-11-26

## 2020-11-25 RX ADMIN — ATORVASTATIN CALCIUM 80 MILLIGRAM(S): 80 TABLET, FILM COATED ORAL at 21:11

## 2020-11-25 RX ADMIN — Medication 5 MILLIGRAM(S): at 21:11

## 2020-11-25 RX ADMIN — TICAGRELOR 180 MILLIGRAM(S): 90 TABLET ORAL at 21:11

## 2020-11-25 RX ADMIN — Medication 0.4 MILLIGRAM(S): at 15:07

## 2020-11-25 RX ADMIN — Medication 650 MILLIGRAM(S): at 15:23

## 2020-11-25 RX ADMIN — SIMETHICONE 80 MILLIGRAM(S): 80 TABLET, CHEWABLE ORAL at 21:34

## 2020-11-25 RX ADMIN — SODIUM CHLORIDE 100 MILLILITER(S): 9 INJECTION INTRAMUSCULAR; INTRAVENOUS; SUBCUTANEOUS at 09:03

## 2020-11-25 RX ADMIN — Medication 650 MILLIGRAM(S): at 15:07

## 2020-11-25 RX ADMIN — Medication 324 MILLIGRAM(S): at 07:39

## 2020-11-25 RX ADMIN — CLOPIDOGREL BISULFATE 600 MILLIGRAM(S): 75 TABLET, FILM COATED ORAL at 09:03

## 2020-11-25 NOTE — H&P ADULT - RS GEN PE MLT RESP DETAILS PC
clear to auscultation bilaterally/no rales/breath sounds equal/no wheezes/no chest wall tenderness/no rhonchi

## 2020-11-25 NOTE — PROGRESS NOTE ADULT - SUBJECTIVE AND OBJECTIVE BOX
Overnight Events:  Subjective: Patient was seen and examined at bedside.    VITALS  Vital Signs Last 24 Hrs  T(C): 36.8 (25 Nov 2020 08:03), Max: 36.8 (25 Nov 2020 06:40)  T(F): 98.2 (25 Nov 2020 08:03), Max: 98.2 (25 Nov 2020 06:40)  HR: 61 (25 Nov 2020 13:20) (61 - 71)  BP: 127/75 (25 Nov 2020 13:20) (127/75 - 163/98)  BP(mean): 89 (25 Nov 2020 13:20) (89 - 119)  RR: 12 (25 Nov 2020 13:20) (12 - 18)  SpO2: 100% (25 Nov 2020 13:20) (99% - 100%)    I&O's Summary      CAPILLARY BLOOD GLUCOSE          PHYSICAL EXAM  General: AO x 3, NAD, Comfortable, Pleasant, Anxious, Agitated, Ill, Frail, Cachectic, Resp distress  HEENT: PERRL/ EOMI, no scleral icterus, no ptosis, MMM, JVD, no thyromegaly  Respiratory: CTA b/l, no wheezes, rales or rhonchi  Cardiovascular: Regular, +S1 + S2  Abdomen: Soft, NTND, normoactive bowel sounds, no rebound, no guarding, no suprapubic tenderness  Extremities: No cyanosis, no clubbing, no edema, pulses equal, no calf tenderness  Skin: No rashes  Lymphatic: No cervical/supraclavicular LAD  Neurological: CN II-XII grossly intact, follows commands, moves all extremities    MEDICATIONS  (STANDING):  atorvastatin 80 milliGRAM(s) Oral at bedtime  chlorhexidine 4% Liquid 1 Application(s) Topical once  sodium chloride 0.9%. 500 milliLiter(s) (100 mL/Hr) IV Continuous <Continuous>    MEDICATIONS  (PRN):      LABS                        14.8   5.99  )-----------( 174      ( 25 Nov 2020 07:20 )             42.8     11-25    143  |  108  |  23  ----------------------------<  112<H>  4.4   |  28  |  0.81    Ca    9.1      25 Nov 2020 07:41    TPro  6.5  /  Alb  4.0  /  TBili  0.8  /  DBili  x   /  AST  23  /  ALT  27  /  AlkPhos  65  11-25    LIVER FUNCTIONS - ( 25 Nov 2020 07:41 )  Alb: 4.0 g/dL / Pro: 6.5 g/dL / ALK PHOS: 65 U/L / ALT: 27 U/L / AST: 23 U/L / GGT: x           PT/INR - ( 25 Nov 2020 07:20 )   PT: 13.6 sec;   INR: 1.14          PTT - ( 25 Nov 2020 07:20 )  PTT:31.5 sec    CARDIAC MARKERS ( 25 Nov 2020 07:41 )  x     / <0.01 ng/mL / 56 U/L / x     / 1.1 ng/mL        Radiology and other tests:     CT Angio Heart and Coronaries w/ IV Cont (11.20.20 @ 11:52) >    Impression:  1.  The calcium score is moderate at 223 Agatston units, which is at the 54th percentile, adjusted for age, gender and race.  2.  Severe disease at proximal bifurcation of the ramus intermedius  3.  Moderate stenosis of the proximal LAD  4.  Remaining coronary segments appear non-obstructive.  5.  Based on the above findings, CT FFR may be considered.     74 former smoker M PMHx COVID April 2020 not requiring hospitalization, HTN & HLD presented to ER this am c/o 4/10 mid-sternal chest pain radiating around back between shoulders blades. Pain is intermittent and worsens with activity with some associated SOB.  Reports pain initially had started earlier this month, reports some increased dyspnea since COVID and recent decreased activity tolerance, has had one prior ER visit: 1st visit Nov 2 with Normal ECG and CT Chest neg PE & dissection, Miranda negative and was discharged home with recommendation for Cardiology follow-up.  Pt had seen Dr Astudillo 11/11/2020 for symptoms, was started on Toprol Xl 25mg daily, Losartan 50mg daily, and Crestor 20mg daily and was sent for CCTA.  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  Pt followed up with Dr Astudillo yesterday reporting no improvement in symptoms on current medications, he was recommended for cardiac catheterization , and was instructed to go to ER if symptoms persisted (at time of visit pt did not want to go to ER).  He was started on Plavix taking one dose of 75mg at 8pm last night.  After having symptoms again this am he contacted Dr Astudillo who sent him to ER.  In ER, Vital signs showed /98  HR 71 RR 18 T 98.2, O2Sat 99% RA, EKG revealed NSR no ischemic changes, CK negative, troponin pending, Covid PCR Test Results negative.     Interim Events: Pt went for PCI resting in mild distress w complaints of CP and HA. Given Sublingual Nitro x1 for CP and 650mg Tylenol for HA.    VITALS  Vital Signs Last 24 Hrs  T(C): 36.8 (25 Nov 2020 08:03), Max: 36.8 (25 Nov 2020 06:40)  T(F): 98.2 (25 Nov 2020 08:03), Max: 98.2 (25 Nov 2020 06:40)  HR: 61 (25 Nov 2020 13:20) (61 - 71)  BP: 127/75 (25 Nov 2020 13:20) (127/75 - 163/98)  BP(mean): 89 (25 Nov 2020 13:20) (89 - 119)  RR: 12 (25 Nov 2020 13:20) (12 - 18)  SpO2: 100% (25 Nov 2020 13:20) (99% - 100%)    I&O's Summary      CAPILLARY BLOOD GLUCOSE    PHYSICAL EXAM  General: AO x 3, NAD, Comfortable, Pleasant, Anxious, Agitated, Ill, Frail, Cachectic, Resp distress  HEENT: PERRL/ EOMI, no scleral icterus, no ptosis, MMM, JVD, no thyromegaly  Respiratory: CTA b/l, no wheezes, rales or rhonchi  Cardiovascular: Regular, +S1 + S2  Abdomen: Soft, NTND, normoactive bowel sounds, no rebound, no guarding, no suprapubic tenderness  Extremities: No cyanosis, no clubbing, no edema, pulses equal, no calf tenderness  Skin: No rashes  Lymphatic: No cervical/supraclavicular LAD  Neurological: CN II-XII grossly intact, follows commands, moves all extremities    MEDICATIONS  (STANDING):  atorvastatin 80 milliGRAM(s) Oral at bedtime  chlorhexidine 4% Liquid 1 Application(s) Topical once  sodium chloride 0.9%. 500 milliLiter(s) (100 mL/Hr) IV Continuous <Continuous>    MEDICATIONS  (PRN):      LABS                        14.8   5.99  )-----------( 174      ( 25 Nov 2020 07:20 )             42.8     11-25    143  |  108  |  23  ----------------------------<  112<H>  4.4   |  28  |  0.81    Ca    9.1      25 Nov 2020 07:41    TPro  6.5  /  Alb  4.0  /  TBili  0.8  /  DBili  x   /  AST  23  /  ALT  27  /  AlkPhos  65  11-25    LIVER FUNCTIONS - ( 25 Nov 2020 07:41 )  Alb: 4.0 g/dL / Pro: 6.5 g/dL / ALK PHOS: 65 U/L / ALT: 27 U/L / AST: 23 U/L / GGT: x           PT/INR - ( 25 Nov 2020 07:20 )   PT: 13.6 sec;   INR: 1.14          PTT - ( 25 Nov 2020 07:20 )  PTT:31.5 sec    CARDIAC MARKERS ( 25 Nov 2020 07:41 )  x     / <0.01 ng/mL / 56 U/L / x     / 1.1 ng/mL        Radiology and other tests:     CT Angio Heart and Coronaries w/ IV Cont (11.20.20 @ 11:52) >    Impression:  1.  The calcium score is moderate at 223 Agatston units, which is at the 54th percentile, adjusted for age, gender and race.  2.  Severe disease at proximal bifurcation of the ramus intermedius  3.  Moderate stenosis of the proximal LAD  4.  Remaining coronary segments appear non-obstructive.  5.  Based on the above findings, CT FFR may be considered.     74 former smoker M PMHx COVID April 2020 not requiring hospitalization, HTN & HLD presented to ER this am c/o 4/10 mid-sternal chest pain radiating around back between shoulders blades. Pain is intermittent and worsens with activity with some associated SOB.  Reports pain initially had started earlier this month, some increased dyspnea since COVID and recent decreased activity tolerance, has had one prior ER visit: 1st visit Nov 2 with Normal ECG and CT Chest neg PE & dissection, Miranda negative and was discharged home with recommendation for Cardiology follow-up.  Pt had seen Dr Astudillo 11/11/2020 for symptoms, was started on Toprol Xl 25mg daily, Losartan 50mg daily, and Crestor 20mg daily and was sent for CCTA.  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  Pt followed up with Dr Astudillo yesterday reporting no improvement in symptoms on current medications, he was recommended for cardiac catheterization , and was instructed to go to ER if symptoms persisted (at time of visit pt did not want to go to ER).  He was started on Plavix taking one dose of 75mg at 8pm last night.  After having symptoms again this am he contacted Dr Astudillo who sent him to ER.  In ER, Vital signs showed /98  HR 71 RR 18 T 98.2, O2Sat 99% RA, EKG revealed NSR no ischemic changes, CK negative, troponin pending, Covid PCR Test Results negative. Received loading dose of Plavix 600mg.     Interim Events: In cath lab recieved Heparin bolus 6000u IV and 2000U IV in for anti-coagulation. In mild distress w complaints of CP and HA. Given Sublingual Nitro x1 for CP and 650mg Tylenol for HA. R Radial Hematoma formed and was massaged down w inc pressure. Pulses felt distal to radial entry and strength 5/5 w/ full ROM    VITALS  Vital Signs Last 24 Hrs  T(C): 36.8 (25 Nov 2020 08:03), Max: 36.8 (25 Nov 2020 06:40)  T(F): 98.2 (25 Nov 2020 08:03), Max: 98.2 (25 Nov 2020 06:40)  HR: 61 (25 Nov 2020 13:20) (61 - 71)  BP: 127/75 (25 Nov 2020 13:20) (127/75 - 163/98)  BP(mean): 89 (25 Nov 2020 13:20) (89 - 119)  RR: 12 (25 Nov 2020 13:20) (12 - 18)  SpO2: 100% (25 Nov 2020 13:20) (99% - 100%)    I&O's Summary      CAPILLARY BLOOD GLUCOSE    PHYSICAL EXAM  General: AO x 3, NAD, Comfortable, Pleasant, Anxious, Agitated, Ill, Frail, Cachectic, Resp distress  HEENT: PERRL/ EOMI, no scleral icterus, no ptosis, MMM, JVD, no thyromegaly  Respiratory: CTA b/l, no wheezes, rales or rhonchi  Cardiovascular: Regular, +S1 + S2  Abdomen: Soft, NTND, normoactive bowel sounds, no rebound, no guarding, no suprapubic tenderness  Extremities:  soft R radial hematoma forming, dec in size w pressure applied. pulses felt distally strength 5/5 w/ full ROM. No cyanosis, no clubbing, no edema, pulses equal, no calf tenderness  Skin: No rashes  Lymphatic: No cervical/supraclavicular LAD  Neurological: CN II-XII grossly intact, follows commands, moves all extremities    MEDICATIONS  (STANDING):  atorvastatin 80 milliGRAM(s) Oral at bedtime  chlorhexidine 4% Liquid 1 Application(s) Topical once  sodium chloride 0.9%. 500 milliLiter(s) (100 mL/Hr) IV Continuous <Continuous>    MEDICATIONS  (PRN):      LABS                        14.8   5.99  )-----------( 174      ( 25 Nov 2020 07:20 )             42.8     11-25    143  |  108  |  23  ----------------------------<  112<H>  4.4   |  28  |  0.81    Ca    9.1      25 Nov 2020 07:41    TPro  6.5  /  Alb  4.0  /  TBili  0.8  /  DBili  x   /  AST  23  /  ALT  27  /  AlkPhos  65  11-25    LIVER FUNCTIONS - ( 25 Nov 2020 07:41 )  Alb: 4.0 g/dL / Pro: 6.5 g/dL / ALK PHOS: 65 U/L / ALT: 27 U/L / AST: 23 U/L / GGT: x           PT/INR - ( 25 Nov 2020 07:20 )   PT: 13.6 sec;   INR: 1.14          PTT - ( 25 Nov 2020 07:20 )  PTT:31.5 sec    CARDIAC MARKERS ( 25 Nov 2020 07:41 )  x     / <0.01 ng/mL / 56 U/L / x     / 1.1 ng/mL        Radiology and other tests:     CT Angio Heart and Coronaries w/ IV Cont (11.20.20 @ 11:52) >    Impression:  1.  The calcium score is moderate at 223 Agatston units, which is at the 54th percentile, adjusted for age, gender and race.  2.  Severe disease at proximal bifurcation of the ramus intermedius  3.  Moderate stenosis of the proximal LAD  4.  Remaining coronary segments appear non-obstructive.  5.  Based on the above findings, CT FFR may be considered.

## 2020-11-25 NOTE — ED PROVIDER NOTE - CONSTITUTIONAL, MLM
normal... Well appearing, slightly lethargic, alert, oriented to person, place, time/situation and in no apparent distress.

## 2020-11-25 NOTE — ED ADULT TRIAGE NOTE - ARRIVAL INFO ADDITIONAL COMMENTS
pt awoke with chest pain this am. pain radiates around back between shoulder blades.   on waiting list for cardiac cath per pt.

## 2020-11-25 NOTE — ED ADULT NURSE NOTE - OBJECTIVE STATEMENT
pt is 74y male, here for midsternal CP that radiates to the back, intermittent and worsens with activity, also some SOB but no dizziness weakness or diaphoresis, pain inially started at the beginning of November, pt has been evaluated in ED twice, most recently on 11/20 and was told he needed to schedule a catch procedure, pt reports hx of HTN and HLD, no other medical problems, pt is a&ox3, ambulates with steady gait, normal lung and heart sounds, no swelling in the legs, skin color normal for ethnicity, NAD present

## 2020-11-25 NOTE — PROGRESS NOTE ADULT - SUBJECTIVE AND OBJECTIVE BOX
Conclusions:  1 vessel Coronary Artery Disease  Syntax score low  Frailty score 5    Reason for admission: treatment of bifurcation lesion with chest pain during procedure requiring IV metoprolol.    Coronary Angiogram  LMCA: Normal  LAD: prox 30% stenosis.  D1: Mild luminal irregularities  ramus: large vessel with 95% stenosis of main branch and bifurcation at the mid segment with side branch 99% stenosis.  LCx: normal  OM1: normal.  RCA: Large, dominant vessel with mild luminal irregularities    Left Heart Catheterization  LV Gram: EF 60%  LVEDP 10 mmHg  No AS, No MR    Intervention  - Successful PCI of 95% stenosis of main ramus vessel with a 2.75x16 mm Promus Elite CHICO. 0% residual stenosis and excellent angiographic result with HANNAH 3 flow post intervention.  - Successful PTCA of 99% stenosis of branch segment of ramus vessel with a 1.2x12  balloon with residual 60% stenosis and HANNAH 3 flow post intervention.    Radial band placed on Right Radial access site with adequate hemostasis prior to leaving the cath lab  No procedural complications    Recommendations  Monitor in CCU post procedure.  Pt received integrillin bolus.  post procedure hydration per protocol.  Dual anti-platelet therapy with Aspirin 81mg daily and switch plavix to ticagrelor 90mg BID for at least 1 year after which Aspirin 81mg daily should be continued indefinitely  Optimal medical therapy as tolerated, including appropriate intensity statin, beta blocker and ACE/ARB if indicated  Risk factor modification and risk reduction strategies with lifestyle changes and preventative cardiology  Follow up with outpatient cardiologist post discharge

## 2020-11-25 NOTE — H&P ADULT - NSHPSOCIALHISTORY_GEN_ALL_CORE
Former 30 pack year smoker Former 30 pack year smoker    EtoH use: drinks 1-2 glasses wine with dinner; last drink two weeks ago    Retired [employer]

## 2020-11-25 NOTE — ED PROVIDER NOTE - OBJECTIVE STATEMENT
Pt is a 75 yo M with PMH of HTN and CAD who p/w 3 wks of dull chest pain acutely worsening this morning. Pain this morning was dull, 7/10 with radiation to back. Chest pain currently improved. He describes associated SOB. No dizziness, headache, fevers, N/V or abd pain. Pain began 3 wk ago and was seen at [facility] ED. CTA and CT abd/pelvis negative for aortic dissection, PE and pancreatitis. Pt f/u with cardiologist Dr. Astudillo and CT coronary 3 day ago showed stenosis of LAD. Pt sent for cardiac cath but has not had it done yet. Currently only taking Plavis 75 mg, no Aspirin. Denies recent travel, fever, cough or sick contacts. Pt is a 73 yo M with PMH of HTN and CAD who p/w 3 wks of dull chest pain acutely worsening this morning. Pain this morning was dull, 7/10 with radiation to back. Chest pain currently improved. He describes associated SOB. No dizziness, headache, fevers, N/V or abd pain. Pain began 3 wk ago and was seen at [facility] ED. CTA and CT abd/pelvis negative for aortic dissection, PE and pancreatitis. Pt f/u with cardiologist Dr. Astudillo and CT coronary 3 day ago showed stenosis of LAD. Pt planned cardiac cath but has not had it done yet. Currently only taking Plavis 75 mg, no Aspirin (seems pt misunderstood instructions for meds and taking plavix instead of aspirin instead of both). Denies recent travel, fever, cough or sick contacts.

## 2020-11-25 NOTE — H&P ADULT - NSHPLABSRESULTS_GEN_ALL_CORE
< from: CT Angio Heart and Coronaries w/ IV Cont (11.20.20 @ 11:52) >    Impression:  1.  The calcium score is moderate at 223 Agatston units, which is at the 54th percentile, adjusted for age, gender and race.  2.  Severe disease at proximal bifurcation of the ramus intermedius  3.  Moderate stenosis of the proximal LAD  4.  Remaining coronary segments appear non-obstructive.  5.  Based on the above findings, CT FFR may be considered.    < end of copied text >

## 2020-11-25 NOTE — PROGRESS NOTE ADULT - ASSESSMENT
74 Male with history of COVID?, former smoker with PMHx recently diagnosed HTN & Hyperlipidemia with unstable angina and known abnormal CCTA revealing prox LAD and prox Ramus disease now admitted for recommended cardiac cath with possible intervention to assess for CAD severity.      Cardiac:  #Unstable Angina - pt prese  - Pre Cath: ASA 324mg given, took Plavix 75mg last night give loading dose 600mg today   -Start IVF hydration NS 100cc/hr  -Continue Toprol XL 25mg daily, Crestor 20mg daily (on discharge), Losartan 50mg daily, ASA 81mg daily.  -F/U HgbA1C and lipid panel  -COVID Negative - check antibodies    Neuro:  No active issues    Renal:  No active issues    Endo:  No active issues    Pulm:  No active issues    GI:  No active issues    PPx: HSQ  FEN: none; replete electrolytes PRN;  Code status: Full  Dispo: c/w care on CCU for monitoring 74 Male with history of COVID?, former smoker with PMHx recently diagnosed HTN & Hyperlipidemia with unstable angina and known abnormal CCTA revealing prox LAD and prox Ramus disease now admitted for recommended cardiac cath with possible intervention to assess for CAD severity.      Cardiac:  #Unstable Angina - pt prese  - Pre Cath: ASA 324mg given, took Plavix 75mg last night give loading dose 600mg today   -Start IVF hydration NS 100cc/hr  -Continue Toprol XL 25mg daily, Atorvastatin 80mg, Losartan 50mg daily, ASA 81mg daily Plavix 75mg.  -F/U HgbA1C and lipid panel  -COVID Negative - check antibodies    Neuro:  No active issues    Renal:  No active issues     Endo:  No active issues    Pulm:  No active issues    GI:  No active issues    PPx: HSQ  FEN: none; replete electrolytes PRN;  Code status: Full  Dispo: c/w care on CCU for monitoring 74 Male with history of COVID?, former smoker with PMHx recently diagnosed HTN & Hyperlipidemia with unstable angina and known abnormal CCTA revealing prox LAD and prox Ramus disease now admitted for recommended cardiac cath with possible intervention to assess for CAD severity.      Cardiac:  #Unstable Angina - pt presents to ED w chest pain w/ documented severe stenosis of Ramus intermedius Trops neg   - Pre Cath: ASA 324mg given, took Plavix 75mg last night give loading dose 600mg today   -Start IVF hydration NS 100cc/hr  -Continue Toprol XL 25mg daily, Atorvastatin 80mg, Losartan 50mg daily, ASA 81mg daily Plavix 75mg.  -F/U HgbA1C and lipid panel  -COVID Negative - check antibodies  - post cath pt experiencing CP 5/10 described as worse than his anginal pain - given 0.4 Sublingual Nitro    Neuro:  No active issues    Renal:  No active issues     Endo:  No active issues    Pulm:  No active issues    GI:  No active issues    PPx: HSQ  FEN: none; replete electrolytes PRN;  Code status: Full  Dispo: c/w care on CCU for monitoring 74 Male with history of COVID?, former smoker with PMHx recently diagnosed HTN & Hyperlipidemia with unstable angina and known abnormal CCTA revealing prox LAD and prox Ramus disease now admitted for cardiac cath with possible intervention to assess for CAD severity.      Cardiac:  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 neg EKG NSR  - Pre Cath: ASA 324mg given, took Plavix 75mg last night give loading dose 600mg today   - Continue Toprol XL 25mg daily, Atorvastatin 80mg,  ASA 81mg daily Plavix 75mg., starting Losartan 11/26  - COVID Negative - check antibodies  - post cath pt experiencing CP 5/10 described as worse than his anginal pain - given 0.4 Sublingual Nitro will cont to give Sublingual nitro PRN     # HTN Pt presented /98 on Toprol 25mg qd and Losartan 50mg qd  - Toprol restarted w/ hold parameters in setting of Anginal sxms  - losartan restart 11/26    Neuro:  No active issues    Renal:  No active issues     Endo:  No active issues    Pulm:  No active issues    GI:  #HLD   - Home med Crestor 20mg  - started on Atorvastatin 80mg at bedtime post PCI     PPx: HSQ  FEN: none; replete electrolytes PRN;  Code status: Full  Dispo: c/w care on CCU for monitoring 74 former smoker M PMHx COVID April 2020 not requiring hospitalization, HTN & HLD presented to ER this am c/o 4/10 mid-sternal chest pain radiating around back between shoulders blades.    Cardiac:  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 neg EKG NSR  - Pre Cath: ASA 324mg given, took Plavix 75mg last night give loading dose 600mg today   - Continue Toprol XL 25mg daily, Atorvastatin 80mg,  ASA 81mg daily, 180 Brilinta loading in PM starting Losartan 11/26,  - COVID Negative - check antibodies  - post cath pt experiencing CP 5/10 described as worse than his anginal pain - given 0.4 Sublingual Nitro will cont to give Sublingual nitro PRN     # HTN Pt presented /98 on Toprol 25mg qd and Losartan 50mg qd  - Toprol restarted w/ hold parameters in setting of Anginal sxms  - losartan restart 11/26    Neuro:  No active issues    Renal:  No active issues     Endo:  No active issues    Pulm:  No active issues    GI:  #HLD   - Home med Crestor 20mg  - started on Atorvastatin 80mg at bedtime post PCI     PPx: HSQ  FEN: none; replete electrolytes PRN;  Code status: Full  Dispo: c/w care on CCU for monitoring 74 former smoker M PMHx COVID April 2020 not requiring hospitalization, HTN & HLD presented to ER this am c/o 4/10 mid-sternal chest pain radiating around back between shoulders blades.    Cardiac:  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 neg EKG NSR  - 11/25 TTE EF 50-55% and mild MR, mild-mod TR  - Pre Cath: ASA 324mg given, took Plavix 75mg last night give loading dose 600mg today   - Continue Toprol XL 25mg daily, Atorvastatin 80mg,  ASA 81mg daily, 180 Brilinta loading in PM, starting Losartan & Brilinta 90mg 11/26,  - COVID Negative - check antibodies  - post cath pt experiencing CP 5/10 described as worse than his anginal pain - given 0.4 Sublingual Nitro will cont to give Sublingual nitro PRN     # HTN Pt presented /98 on Toprol 25mg qd and Losartan 50mg qd  - Toprol restarted w/ hold parameters in setting of Anginal sxms  - losartan restart 11/26 - holding post cath to monitor Cr    Neuro:  No active issues    Renal:  No active issues     Endo:  No active issues    Pulm:  No active issues    GI:  #HLD   - Home med Crestor 20mg  - started on Atorvastatin 80mg at bedtime post PCI     PPx: HSQ  FEN: none; replete electrolytes PRN;  Code status: Full  Dispo: c/w care on CCU for monitoring

## 2020-11-25 NOTE — ED PROVIDER NOTE - ATTENDING CONTRIBUTION TO CARE
Attending Statement: I have personally seen and examined this patient. I have fully participated in the care of this patient. I have reviewed all pertinent clinical information, including history, physical exam, plan and the Medical Student's note and agree except as noted.   Pt well appearing here w/ unstable angina. EKG with no acute ischemia. Cath lab aware and adding pt on. given full dose asa.

## 2020-11-25 NOTE — H&P ADULT - HISTORY OF PRESENT ILLNESS
74 Male with history of COVID?, former smoker with PMHx recently diagnosed HTN & Hyperlipidemia presented to ER this am c/o 4/10 mid-sternal chest pain radiating around back between shoulders blades. Pain is intermittent and worsens with activity with some associated SOB.  Reports pain initially had started earlier this month, has had one prior ER visit: 1st visit Nov 2 with Normal ECG and CT Chest neg PE & dissection, Miranda negative and was discharged home with recommendation for Cardiology follow-up.  Pt had seen Dr Astudillo 11/11/2020 for symptoms, was started on ASA, Toprol Xl 25mg daily, Losartan 50mg daily, and Crestor 20mg daily and was sent for CCTA.  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  Pt followed up with Dr Astudillo yesterday reporting no improvement in symptoms on current medications, he was recommended for cardiac catheterization , and was instructed to go to ER if symptoms persisted (at time of visit pt did not want to go to ER).  He was started on Plavix.  After having symptoms again this am he contacted Dr Astudillo who sent him to ER.  In ER, Vital signs showed /98  HR 71 RR 18 T 98.2, O2Sat 99% RA, EKG revealed NSR no ischemic changes, Covid Test Results Pending.   Pt was given ASA 324mg x1 and is now admitted for recommended Cardiac Catheterization with possible intervention in setting of Unstable Angina with known abnormal CCTA to assess for severity of CAD.    Pt denies any recent travel outside of [facility] in past 14 days, no sick contacts.    74 Male with history of COVID April 2020 not requiring hospitalization but wife was hospitalized with COVID and suffered CVA, former smoker with PMHx recently diagnosed HTN & Hyperlipidemia presented to ER this am c/o 4/10 mid-sternal chest pain radiating around back between shoulders blades. Pain is intermittent and worsens with activity with some associated SOB.  Reports pain initially had started earlier this month, reports some increased dyspnea since COVID and recent decreased activity tolerance, has had one prior ER visit: 1st visit Nov 2 with Normal ECG and CT Chest neg PE & dissection, Miranda negative and was discharged home with recommendation for Cardiology follow-up.  Pt had seen Dr Astudillo 11/11/2020 for symptoms, was started on Toprol Xl 25mg daily, Losartan 50mg daily, and Crestor 20mg daily and was sent for CCTA.  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  Pt followed up with Dr Astudillo yesterday reporting no improvement in symptoms on current medications, he was recommended for cardiac catheterization , and was instructed to go to ER if symptoms persisted (at time of visit pt did not want to go to ER).  He was started on Plavix taking one dose of 75mg at 8pm last night.  After having symptoms again this am he contacted Dr Astudillo who sent him to ER.  In ER, Vital signs showed /98  HR 71 RR 18 T 98.2, O2Sat 99% RA, EKG revealed NSR no ischemic changes, Covid PCR Test Results negative.   Pt was given ASA 324mg x1 and is now admitted for recommended Cardiac Catheterization with possible intervention in setting of Unstable Angina with known abnormal CCTA to assess for severity of CAD.    Pt denies any recent travel outside of [facility] in past 14 days, no sick contacts.    74 Male with FHx fatal MI in two siblings, history of COVID April 2020 not requiring hospitalization but wife was hospitalized with COVID and suffered CVA, former smoker with PMHx recently diagnosed HTN & Hyperlipidemia presented to ER this am c/o 4/10 mid-sternal chest pain radiating around back between shoulders blades. Pain is intermittent and worsens with activity with some associated SOB.  Reports pain initially had started earlier this month, reports some increased dyspnea since COVID and recent decreased activity tolerance, has had one prior ER visit: 1st visit Nov 2 with Normal ECG and CT Chest neg PE & dissection, Miranda negative and was discharged home with recommendation for Cardiology follow-up.  Pt had seen Dr Astudillo 11/11/2020 for symptoms, was started on Toprol Xl 25mg daily, Losartan 50mg daily, and Crestor 20mg daily and was sent for CCTA.  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  Pt followed up with Dr Astudillo yesterday reporting no improvement in symptoms on current medications, he was recommended for cardiac catheterization , and was instructed to go to ER if symptoms persisted (at time of visit pt did not want to go to ER).  He was started on Plavix taking one dose of 75mg at 8pm last night.  After having symptoms again this am he contacted Dr Astudillo who sent him to ER.  In ER, Vital signs showed /98  HR 71 RR 18 T 98.2, O2Sat 99% RA, EKG revealed NSR no ischemic changes, CK negative, troponin pending, Covid PCR Test Results negative.   Pt was given ASA 324mg x1 and is now admitted for recommended Cardiac Catheterization with possible intervention in setting of Unstable Angina with known abnormal CCTA to assess for severity of CAD.    Pt denies any recent travel outside of [facility] in past 14 days, no sick contacts.

## 2020-11-25 NOTE — H&P ADULT - NSICDXPASTMEDICALHX_GEN_ALL_CORE_FT
PAST MEDICAL HISTORY:  Hyperlipidemia     Hypertension     No pertinent past medical history      PAST MEDICAL HISTORY:  Hyperlipidemia     Hypertension

## 2020-11-25 NOTE — H&P ADULT - NSICDXPASTSURGICALHX_GEN_ALL_CORE_FT
PAST SURGICAL HISTORY:  History of appendectomy      PAST SURGICAL HISTORY:  History of appendectomy     History of cataract surgery B/L eyes    Skin cyst Removal on right chest

## 2020-11-25 NOTE — H&P ADULT - ASSESSMENT
74 Male with history of COVID?, former smoker with PMHx recently diagnosed HTN & Hyperlipidemia with unstable angina and known abnormal CCTA revealing prox LAD and prox Ramus disease now admitted for recommended cardiac cath with possible intervention to assess for CAD severity.    -NPO for cardiac cath today with Dr Mcmanus  -ASA 324mg given, took Plavix 75mg last night   -Start IVF hydration NS 100cc/hr  -Continue Toprol XL 25mg daily, Crestor 20mg daily (on discharge), Losartan 50mg daily, ASA 81mg daily.  -Droplet/Contact precautions until COVID results known    Risks & benefits of procedure and alternative therapy have been explained to the patient including but not limited to: allergic reaction, bleeding w/possible need for blood transfusion, infection, renal and vascular compromise, limb damage, arrhythmia, stroke, vessel dissection/perforation, Myocardial infarction, emergent CABG. Informed consent obtained and in chart.     ASA 3  Mallampati   CCS Class 4 74 Male with history of COVID?, former smoker with PMHx recently diagnosed HTN & Hyperlipidemia with unstable angina and known abnormal CCTA revealing prox LAD and prox Ramus disease now admitted for recommended cardiac cath with possible intervention to assess for CAD severity.    -NPO for cardiac cath today with Dr Mcmanus  -ASA 324mg given, took Plavix 75mg last night give loading dose 600mg today   -Start IVF hydration NS 100cc/hr  -Continue Toprol XL 25mg daily, Crestor 20mg daily (on discharge), Losartan 50mg daily, ASA 81mg daily.  -F/U HgbA1C and lipid panel  -COVID Negative - check antibodies    Risks & benefits of procedure and alternative therapy have been explained to the patient including but not limited to: allergic reaction, bleeding w/possible need for blood transfusion, infection, renal and vascular compromise, limb damage, arrhythmia, stroke, vessel dissection/perforation, Myocardial infarction, emergent CABG. Informed consent obtained and in chart.     ASA 3  Mallampati   CCS Class 4

## 2020-11-25 NOTE — ED ADULT NURSE NOTE - NS ED NURSE REPORT GIVEN TO FT
RN Sharron evangelina johnson per RN ronnie, report given to bill slater cath lab per BILL glasgow at 7:55

## 2020-11-25 NOTE — ED PROVIDER NOTE - CLINICAL SUMMARY MEDICAL DECISION MAKING FREE TEXT BOX
Pt is a 75 yo M with 3 wk of dull chest pain acutely worsening this morning and radiation to back likely due to CAD. Pain currently improved. CT coronary done outpt showed stenosis of LAD. EKG today neg for STEMI. Aspirin 325 given and scheduled for add on angio today.    Plan:  -f/u labs, trend troponin  -admit for add on angio today  -aspirin 325  -consult cards   -d/w cardiologist Baudilio  -JAIMEE culp

## 2020-11-26 ENCOUNTER — TRANSCRIPTION ENCOUNTER (OUTPATIENT)
Age: 75
End: 2020-11-26

## 2020-11-26 VITALS
TEMPERATURE: 98 F | RESPIRATION RATE: 20 BRPM | OXYGEN SATURATION: 100 % | HEART RATE: 77 BPM | SYSTOLIC BLOOD PRESSURE: 119 MMHG | DIASTOLIC BLOOD PRESSURE: 85 MMHG

## 2020-11-26 LAB
ALBUMIN SERPL ELPH-MCNC: 3.6 G/DL — SIGNIFICANT CHANGE UP (ref 3.3–5)
ALBUMIN SERPL ELPH-MCNC: 4 G/DL — SIGNIFICANT CHANGE UP (ref 3.3–5)
ALP SERPL-CCNC: 59 U/L — SIGNIFICANT CHANGE UP (ref 40–120)
ALP SERPL-CCNC: 71 U/L — SIGNIFICANT CHANGE UP (ref 40–120)
ALT FLD-CCNC: 33 U/L — SIGNIFICANT CHANGE UP (ref 10–45)
ALT FLD-CCNC: 36 U/L — SIGNIFICANT CHANGE UP (ref 10–45)
ANION GAP SERPL CALC-SCNC: 12 MMOL/L — SIGNIFICANT CHANGE UP (ref 5–17)
ANION GAP SERPL CALC-SCNC: 9 MMOL/L — SIGNIFICANT CHANGE UP (ref 5–17)
AST SERPL-CCNC: 32 U/L — SIGNIFICANT CHANGE UP (ref 10–40)
AST SERPL-CCNC: 36 U/L — SIGNIFICANT CHANGE UP (ref 10–40)
BILIRUB DIRECT SERPL-MCNC: <0.2 MG/DL — SIGNIFICANT CHANGE UP (ref 0–0.2)
BILIRUB SERPL-MCNC: 1.2 MG/DL — SIGNIFICANT CHANGE UP (ref 0.2–1.2)
BILIRUB SERPL-MCNC: 1.3 MG/DL — HIGH (ref 0.2–1.2)
BUN SERPL-MCNC: 16 MG/DL — SIGNIFICANT CHANGE UP (ref 7–23)
BUN SERPL-MCNC: 17 MG/DL — SIGNIFICANT CHANGE UP (ref 7–23)
CALCIUM SERPL-MCNC: 8.6 MG/DL — SIGNIFICANT CHANGE UP (ref 8.4–10.5)
CALCIUM SERPL-MCNC: 9 MG/DL — SIGNIFICANT CHANGE UP (ref 8.4–10.5)
CHLORIDE SERPL-SCNC: 104 MMOL/L — SIGNIFICANT CHANGE UP (ref 96–108)
CHLORIDE SERPL-SCNC: 106 MMOL/L — SIGNIFICANT CHANGE UP (ref 96–108)
CO2 SERPL-SCNC: 20 MMOL/L — LOW (ref 22–31)
CO2 SERPL-SCNC: 27 MMOL/L — SIGNIFICANT CHANGE UP (ref 22–31)
CREAT SERPL-MCNC: 0.7 MG/DL — SIGNIFICANT CHANGE UP (ref 0.5–1.3)
CREAT SERPL-MCNC: 0.84 MG/DL — SIGNIFICANT CHANGE UP (ref 0.5–1.3)
GLUCOSE SERPL-MCNC: 109 MG/DL — HIGH (ref 70–99)
GLUCOSE SERPL-MCNC: 96 MG/DL — SIGNIFICANT CHANGE UP (ref 70–99)
HCT VFR BLD CALC: 39.7 % — SIGNIFICANT CHANGE UP (ref 39–50)
HCV AB S/CO SERPL IA: 0.1 S/CO — SIGNIFICANT CHANGE UP
HCV AB SERPL-IMP: SIGNIFICANT CHANGE UP
HGB BLD-MCNC: 14 G/DL — SIGNIFICANT CHANGE UP (ref 13–17)
MAGNESIUM SERPL-MCNC: 1.9 MG/DL — SIGNIFICANT CHANGE UP (ref 1.6–2.6)
MAGNESIUM SERPL-MCNC: 2.3 MG/DL — SIGNIFICANT CHANGE UP (ref 1.6–2.6)
MCHC RBC-ENTMCNC: 30.2 PG — SIGNIFICANT CHANGE UP (ref 27–34)
MCHC RBC-ENTMCNC: 35.3 GM/DL — SIGNIFICANT CHANGE UP (ref 32–36)
MCV RBC AUTO: 85.7 FL — SIGNIFICANT CHANGE UP (ref 80–100)
NRBC # BLD: 0 /100 WBCS — SIGNIFICANT CHANGE UP (ref 0–0)
PHOSPHATE SERPL-MCNC: 2.9 MG/DL — SIGNIFICANT CHANGE UP (ref 2.5–4.5)
PLATELET # BLD AUTO: 163 K/UL — SIGNIFICANT CHANGE UP (ref 150–400)
POTASSIUM SERPL-MCNC: 4 MMOL/L — SIGNIFICANT CHANGE UP (ref 3.5–5.3)
POTASSIUM SERPL-MCNC: 4.2 MMOL/L — SIGNIFICANT CHANGE UP (ref 3.5–5.3)
POTASSIUM SERPL-SCNC: 4 MMOL/L — SIGNIFICANT CHANGE UP (ref 3.5–5.3)
POTASSIUM SERPL-SCNC: 4.2 MMOL/L — SIGNIFICANT CHANGE UP (ref 3.5–5.3)
PROT SERPL-MCNC: 6.4 G/DL — SIGNIFICANT CHANGE UP (ref 6–8.3)
PROT SERPL-MCNC: 6.8 G/DL — SIGNIFICANT CHANGE UP (ref 6–8.3)
RBC # BLD: 4.63 M/UL — SIGNIFICANT CHANGE UP (ref 4.2–5.8)
RBC # FLD: 12.6 % — SIGNIFICANT CHANGE UP (ref 10.3–14.5)
SODIUM SERPL-SCNC: 138 MMOL/L — SIGNIFICANT CHANGE UP (ref 135–145)
SODIUM SERPL-SCNC: 140 MMOL/L — SIGNIFICANT CHANGE UP (ref 135–145)
WBC # BLD: 7.29 K/UL — SIGNIFICANT CHANGE UP (ref 3.8–10.5)
WBC # FLD AUTO: 7.29 K/UL — SIGNIFICANT CHANGE UP (ref 3.8–10.5)

## 2020-11-26 PROCEDURE — 99238 HOSP IP/OBS DSCHRG MGMT 30/<: CPT

## 2020-11-26 PROCEDURE — 71045 X-RAY EXAM CHEST 1 VIEW: CPT | Mod: 26

## 2020-11-26 RX ORDER — ROSUVASTATIN CALCIUM 5 MG/1
1 TABLET ORAL
Qty: 0 | Refills: 0 | DISCHARGE

## 2020-11-26 RX ORDER — TICAGRELOR 90 MG/1
1 TABLET ORAL
Qty: 60 | Refills: 3
Start: 2020-11-26 | End: 2021-03-25

## 2020-11-26 RX ORDER — ACETAMINOPHEN 500 MG
650 TABLET ORAL EVERY 6 HOURS
Refills: 0 | Status: DISCONTINUED | OUTPATIENT
Start: 2020-11-26 | End: 2020-11-26

## 2020-11-26 RX ORDER — ATORVASTATIN CALCIUM 80 MG/1
1 TABLET, FILM COATED ORAL
Qty: 30 | Refills: 3
Start: 2020-11-26 | End: 2021-03-25

## 2020-11-26 RX ORDER — CLOPIDOGREL BISULFATE 75 MG/1
1 TABLET, FILM COATED ORAL
Qty: 0 | Refills: 0 | DISCHARGE

## 2020-11-26 RX ORDER — MAGNESIUM SULFATE 500 MG/ML
1 VIAL (ML) INJECTION ONCE
Refills: 0 | Status: COMPLETED | OUTPATIENT
Start: 2020-11-26 | End: 2020-11-26

## 2020-11-26 RX ORDER — ASPIRIN/CALCIUM CARB/MAGNESIUM 324 MG
1 TABLET ORAL
Qty: 30 | Refills: 3
Start: 2020-11-26 | End: 2021-03-25

## 2020-11-26 RX ADMIN — Medication 25 MILLIGRAM(S): at 05:08

## 2020-11-26 RX ADMIN — Medication 650 MILLIGRAM(S): at 03:52

## 2020-11-26 RX ADMIN — Medication 100 GRAM(S): at 07:25

## 2020-11-26 RX ADMIN — TICAGRELOR 90 MILLIGRAM(S): 90 TABLET ORAL at 09:33

## 2020-11-26 RX ADMIN — Medication 81 MILLIGRAM(S): at 12:00

## 2020-11-26 RX ADMIN — Medication 650 MILLIGRAM(S): at 02:52

## 2020-11-26 NOTE — PROGRESS NOTE ADULT - ASSESSMENT
74 former smoker M PMHx COVID April 2020 not requiring hospitalization, HTN & HLD presented to ER this am c/o 4/10 mid-sternal chest pain radiating around back between shoulders blades.    Cardiac:  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 neg EKG NSR  - 11/25 TTE EF 50-55% and mild MR, mild-mod TR  - Pre Cath: ASA 324mg given, took Plavix 75mg last night give loading dose 600mg today   - Continue Toprol XL 25mg daily, Atorvastatin 80mg,  ASA 81mg daily,   - started Losartan & Brilinta 90mg  - post cath pt experiencing CP 5/10 described as worse than his anginal pain - given 0.4 Sublingual Nitro will cont to give Sublingual nitro PRN    # HTN Pt presented /98 on Toprol 25mg qd and Losartan 50mg qd  - Toprol restarted w/ hold parameters in setting of Anginal sxms  - losartan restarted 50mg     #PCI Hematoma R Radial wrist  - in the setting of 2 doses of Integrillin during PCI  - soft hematoma, purple flat in appearence- distal pulse palpated and warm w/ 5/5 strength and full rom  - margins of hematoma marker PM has since expanded beyond margins    Neuro:  No active issues    Renal:  No active issues     Endo:  No active issues    Pulm:  No active issues    GI:  #HLD   - Home med Crestor 20mg  - started on Atorvastatin 80mg at bedtime post PCI     PPx: HSQ  FEN: none; replete electrolytes PRN; DASH/TLC  Code status: Full  Dispo: c/w care on CCU for monitoring     74 former smoker M PMHx COVID April 2020 not requiring hospitalization, HTN & HLD presented to ER this am c/o 4/10 mid-sternal chest pain radiating around back between shoulders blades.    Cardiac:  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 neg EKG NSR  - 11/25 TTE EF 50-55% and mild MR, mild-mod TR  - Pre Cath: ASA 324mg given, took Plavix 75mg last night give loading dose 600mg today   - Continue Toprol XL 25mg daily, Atorvastatin 80mg,  ASA 81mg daily,   - started Losartan & Brilinta 90mg  - post cath pt experiencing CP 5/10 described as worse than his anginal pain - given 0.4 Sublingual Nitro will cont to give Sublingual nitro PRN    # HTN Pt presented /98 on Toprol 25mg qd and Losartan 50mg qd  - Toprol restarted w/ hold parameters in setting of Anginal sxms  - losartan restarted 50mg     #PCI Hematoma R Radial wrist  - in the setting of 2 doses of Integrillin during PCI  - soft hematoma, purple flat in appearence- distal pulse palpated and warm w/ 5/5 strength and full rom  - margins of hematoma marker PM - arm less raised appears to be ecchymosis currently  Neuro:  No active issues    Renal:  No active issues     Endo:  No active issues    Pulm:  No active issues    GI:  #HLD   - Home med Crestor 20mg  - started on Atorvastatin 80mg at bedtime post PCI     PPx: HSQ  FEN: none; replete electrolytes PRN; DASH/TLC  Code status: Full  Dispo: c/w care on CCU for monitoring

## 2020-11-26 NOTE — DISCHARGE NOTE NURSING/CASE MANAGEMENT/SOCIAL WORK - PATIENT PORTAL LINK FT
You can access the FollowMyHealth Patient Portal offered by [facility] by registering at the following website: [email or web address]. By joining [facility]’s FollowMyHealth portal, you will also be able to view your health information using other applications (apps) compatible with our system.

## 2020-11-26 NOTE — DISCHARGE NOTE PROVIDER - NSDCFUADDAPPT_GEN_ALL_CORE_FT
Please make follow up appointment with Dr. Astudillo 1-2 weeks from today to follow up your stent placement

## 2020-11-26 NOTE — PROGRESS NOTE ADULT - SUBJECTIVE AND OBJECTIVE BOX
Overnight Events:  Subjective: Patient was seen and examined at bedside. Pt complained of a single episode of CP overnight non radiating pain that was relieved by tylenol. During the episode pt denied Lightheadedness, fatigue Numbness tingling, Nausea, vomiting shoulder pain, wrist pain. Presently pt in resting in no actute distress w no complaints. ROS neg.    VITALS  Vital Signs Last 24 Hrs  T(C): 36.6 (26 Nov 2020 05:16), Max: 36.8 (25 Nov 2020 08:03)  T(F): 97.9 (26 Nov 2020 05:16), Max: 98.3 (26 Nov 2020 01:00)  HR: 68 (26 Nov 2020 06:00) (55 - 72)  BP: 126/67 (26 Nov 2020 06:00) (104/63 - 163/98)  BP(mean): 91 (26 Nov 2020 06:00) (79 - 119)  RR: 19 (26 Nov 2020 06:00) (12 - 25)  SpO2: 98% (26 Nov 2020 06:00) (98% - 100%)    I&O's Summary    25 Nov 2020 07:01  -  26 Nov 2020 07:00  --------------------------------------------------------  IN: 520 mL / OUT: 2250 mL / NET: -1730 mL    CAPILLARY BLOOD GLUCOSE    PHYSICAL EXAM  General: AO x 3, NAD, Comfortable, Pleasant, Anxious, Agitated, Ill, Frail, Cachectic, Resp distress  HEENT: PERRL/ EOMI, no scleral icterus, no ptosis, MMM, JVD, no thyromegaly  Respiratory: CTA b/l, no wheezes, rales or rhonchi  Cardiovascular: Regular, +S1 + S2  Abdomen: Soft, NTND, normoactive bowel sounds, no rebound, no guarding, no suprapubic tenderness  Extremities: Soft purple flat hematoma (expanded beyond PM) No cyanosis, no clubbing, no edema, pulses equal, no calf tenderness  Skin: No rashes  Lymphatic: No cervical/supraclavicular LAD  Neurological: CN II-XII grossly intact, follows commands, moves all extremities    MEDICATIONS  (STANDING):  aspirin enteric coated 81 milliGRAM(s) Oral daily  atorvastatin 80 milliGRAM(s) Oral at bedtime  chlorhexidine 4% Liquid 1 Application(s) Topical once  losartan 50 milliGRAM(s) Oral daily  melatonin 5 milliGRAM(s) Oral at bedtime  metoprolol succinate ER 25 milliGRAM(s) Oral daily  ticagrelor 90 milliGRAM(s) Oral every 12 hours    MEDICATIONS  (PRN):  acetaminophen   Tablet .. 650 milliGRAM(s) Oral every 6 hours PRN Temp greater or equal to 38C (100.4F), Mild Pain (1 - 3)      LABS                        14.0   7.29  )-----------( 163      ( 26 Nov 2020 06:00 )             39.7     11-26    138  |  106  |  17  ----------------------------<  109<H>  4.0   |  20<L>  |  0.70    Ca    8.6      26 Nov 2020 06:00  Phos  2.9     11-26  Mg     1.9     11-26    TPro  6.4  /  Alb  3.6  /  TBili  1.3<H>  /  DBili  x   /  AST  36  /  ALT  33  /  AlkPhos  59  11-26    LIVER FUNCTIONS - ( 26 Nov 2020 06:00 )  Alb: 3.6 g/dL / Pro: 6.4 g/dL / ALK PHOS: 59 U/L / ALT: 33 U/L / AST: 36 U/L / GGT: x           PT/INR - ( 25 Nov 2020 07:20 )   PT: 13.6 sec;   INR: 1.14          PTT - ( 25 Nov 2020 07:20 )  PTT:31.5 sec    CARDIAC MARKERS ( 25 Nov 2020 07:41 )  x     / <0.01 ng/mL / 56 U/L / x     / 1.1 ng/mL        Radiology and other tests: Reviewed     Overnight Events:  Subjective: Patient was seen and examined at bedside. Pt complained of a single episode of CP overnight non radiating pain that was relieved by tylenol. Low suspcision of Anginal event.  During the episode pt denied Lightheadedness, fatigue Numbness tingling, Nausea, vomiting shoulder pain, wrist pain. Presently pt in resting in no actute distress w no complaints. ROS neg.    VITALS  Vital Signs Last 24 Hrs  T(C): 36.6 (26 Nov 2020 05:16), Max: 36.8 (25 Nov 2020 08:03)  T(F): 97.9 (26 Nov 2020 05:16), Max: 98.3 (26 Nov 2020 01:00)  HR: 68 (26 Nov 2020 06:00) (55 - 72)  BP: 126/67 (26 Nov 2020 06:00) (104/63 - 163/98)  BP(mean): 91 (26 Nov 2020 06:00) (79 - 119)  RR: 19 (26 Nov 2020 06:00) (12 - 25)  SpO2: 98% (26 Nov 2020 06:00) (98% - 100%)    I&O's Summary    25 Nov 2020 07:01  -  26 Nov 2020 07:00  --------------------------------------------------------  IN: 520 mL / OUT: 2250 mL / NET: -1730 mL    CAPILLARY BLOOD GLUCOSE    PHYSICAL EXAM  General: AO x 3, NAD, Comfortable, Pleasant, Anxious, Agitated, Ill, Frail, Cachectic, Resp distress  HEENT: PERRL/ EOMI, no scleral icterus, no ptosis, MMM, JVD, no thyromegaly  Respiratory: CTA b/l, no wheezes, rales or rhonchi  Cardiovascular: Regular, +S1 + S2  Abdomen: Soft, NTND, normoactive bowel sounds, no rebound, no guarding, no suprapubic tenderness  Extremities: Soft purple flat ecchymosis No cyanosis, no clubbing, no edema, pulses equal, no calf tenderness  Skin: No rashes  Lymphatic: No cervical/supraclavicular LAD  Neurological: CN II-XII grossly intact, follows commands, moves all extremities    MEDICATIONS  (STANDING):  aspirin enteric coated 81 milliGRAM(s) Oral daily  atorvastatin 80 milliGRAM(s) Oral at bedtime  chlorhexidine 4% Liquid 1 Application(s) Topical once  losartan 50 milliGRAM(s) Oral daily  melatonin 5 milliGRAM(s) Oral at bedtime  metoprolol succinate ER 25 milliGRAM(s) Oral daily  ticagrelor 90 milliGRAM(s) Oral every 12 hours    MEDICATIONS  (PRN):  acetaminophen   Tablet .. 650 milliGRAM(s) Oral every 6 hours PRN Temp greater or equal to 38C (100.4F), Mild Pain (1 - 3)      LABS                        14.0   7.29  )-----------( 163      ( 26 Nov 2020 06:00 )             39.7     11-26    138  |  106  |  17  ----------------------------<  109<H>  4.0   |  20<L>  |  0.70    Ca    8.6      26 Nov 2020 06:00  Phos  2.9     11-26  Mg     1.9     11-26    TPro  6.4  /  Alb  3.6  /  TBili  1.3<H>  /  DBili  x   /  AST  36  /  ALT  33  /  AlkPhos  59  11-26    LIVER FUNCTIONS - ( 26 Nov 2020 06:00 )  Alb: 3.6 g/dL / Pro: 6.4 g/dL / ALK PHOS: 59 U/L / ALT: 33 U/L / AST: 36 U/L / GGT: x           PT/INR - ( 25 Nov 2020 07:20 )   PT: 13.6 sec;   INR: 1.14          PTT - ( 25 Nov 2020 07:20 )  PTT:31.5 sec    CARDIAC MARKERS ( 25 Nov 2020 07:41 )  x     / <0.01 ng/mL / 56 U/L / x     / 1.1 ng/mL        Radiology and other tests: Reviewed

## 2020-11-26 NOTE — DISCHARGE NOTE PROVIDER - NSDCCPCAREPLAN_GEN_ALL_CORE_FT
PRINCIPAL DISCHARGE DIAGNOSIS  Diagnosis: Unstable angina  Assessment and Plan of Treatment: You presented to the Emergency Room with Unstable Angina. This is defined as chest pain that does not go away with rest or a medication but under your tongue called Nitroglycerin. This is a result decresed or loss of blood flow to your heart caused by blockage in your artery. Upon your arrival to the Emergency room you were brought the the Cardiac Catheterization Lab. In the lab they completed a Percutanous Coronary Interventionwhich is a non-surgical procedure that uses a catheter (a thin flexible tube) to place a small structure called a stent to open up blood vessels in the heart that have been narrowed by plaque buildup, a condition known as atherosclerosis. The catheter was inserted in an artery in your right wrist. This procedures sucessfully opened up the two blocked artieres and allowed blood to flow through the previously blocked artieres.   You will be starting a new medication regimine when you are discharged today.  Your new regimine is to take Atorvastatin 80mg at bedtime, Aspirin 81mg every day, Brilitina 90mg every 12hrs (two times a day once in morning and once in night), Losartan 50mg everyday and Toprol XL 25mg everyday       PRINCIPAL DISCHARGE DIAGNOSIS  Diagnosis: Unstable angina  Assessment and Plan of Treatment: You presented to the Emergency Room with Unstable Angina. This is defined as chest pain that does not go away with rest or a medication but under your tongue called Nitroglycerin. This is a result decresed or loss of blood flow to your heart caused by blockage in your artery. Upon your arrival to the Emergency room you were brought the the Cardiac Catheterization Lab. In the lab they completed a Percutanous Coronary Interventionwhich is a non-surgical procedure that uses a catheter (a thin flexible tube) to place a small structure called a stent to open up blood vessels in the heart that have been narrowed by plaque buildup, a condition known as atherosclerosis. The catheter was inserted in an artery in your right wrist. After the procedure there was swelling at the catheter insertion site. This is not uncommon and could have been a result of a hematoma (localized bleeding at the entry site). This hematoma has since reolved, and the area around your wrist will be purple  for some time due to the dissaption of the pooled blood. This procedures sucessfully opened up the two blocked artieres and allowed blood to flow through the previously blocked artieres. If you expierence chest pain please contact your physician immedialtey and go to emergency room. If you expierence numbness, tingling or loss of sensation please contact your physician and go to the emergency room.  You will be starting a new medication regimine when you are discharged today.  Your new regimine is to take Atorvastatin 80mg at bedtime, Aspirin 81mg every day, Brilitina 90mg every 12hrs (two times a day once in morning and once in night), Losartan 50mg everyday and Toprol XL 25mg everyday       PRINCIPAL DISCHARGE DIAGNOSIS  Diagnosis: Unstable angina  Assessment and Plan of Treatment: You presented to the Emergency Room with Unstable Angina. This is defined as chest pain that does not go away with rest or a medication but under your tongue called Nitroglycerin. This is a result decresed or loss of blood flow to your heart caused by blockage in your artery. Upon your arrival to the Emergency room you were brought the the Cardiac Catheterization Lab. In the lab they completed a Percutanous Coronary Interventionwhich is a non-surgical procedure that uses a catheter (a thin flexible tube) to place a small structure called a stent to open up blood vessels in the heart that have been narrowed by plaque buildup, a condition known as atherosclerosis. The catheter was inserted in an artery in your right wrist. After the procedure there was swelling at the catheter insertion site. This is not uncommon and could have been a result of a hematoma (localized bleeding at the entry site). This hematoma has since reolved, and the area around your wrist will be purple  for some time due to the dissaption of the pooled blood. This procedures sucessfully opened up the two blocked artieres and allowed blood to flow through the previously blocked artieres. Please avoid any heavy lifitng and strenous acitivites until your follw up appointment with Dr. Astudillo.  If you expierence chest pain please contact your physician immedialtey and go to emergency room. If you expierence numbness, tingling or loss of sensation please contact your physician and go to the emergency room.  You will be starting a new medication regimine when you are discharged today.  Your new regimine is to take Atorvastatin 80mg at bedtime, Aspirin 81mg every day, Brilitina 90mg every 12hrs (two times a day once in morning and once in night), Losartan 50mg everyday and Toprol XL 25mg everyday

## 2020-11-26 NOTE — DISCHARGE NOTE PROVIDER - CARE PROVIDER_API CALL
Juan A Astudillo G  CARDIOLOGY  [address] [address]  Phone: [phone]  Fax: [phone]  Established Patient  Follow Up Time: 2 weeks

## 2020-11-26 NOTE — DISCHARGE NOTE PROVIDER - NSDCMRMEDTOKEN_GEN_ALL_CORE_FT
Crestor 20 mg oral tablet: 1 tab(s) orally once a day (at bedtime)  losartan 50 mg oral tablet: 1 tab(s) orally once a day  Metoprolol Succinate ER 25 mg oral tablet, extended release: 1 tab(s) orally once a day  Plavix 75 mg oral tablet: 1 tab(s) orally once a day (started 11/24/2020)    aspirin 81 mg oral delayed release tablet: 1 tab(s) orally once a day  atorvastatin 80 mg oral tablet: 1 tab(s) orally once a day (at bedtime)  Brilinta (ticagrelor) 90 mg oral tablet: 1 tab(s) orally every 12 hours  losartan 50 mg oral tablet: 1 tab(s) orally once a day  Metoprolol Succinate ER 25 mg oral tablet, extended release: 1 tab(s) orally once a day

## 2020-11-26 NOTE — DISCHARGE NOTE PROVIDER - HOSPITAL COURSE
74 former smoker M PMHx COVID April 2020 not requiring hospitalization, HTN & HLD presented to ER this am c/o 4/10 mid-sternal chest pain radiating around back between shoulders blades.    Cardiac:  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 neg EKG NSR  - 11/25 TTE EF 50-55% and mild MR, mild-mod TR  - Pre Cath: ASA 324mg given, given loading dose 600mg, continued Toprol XL 25mg daily, Atorvastatin 80mg,  ASA 81mg daily in patient    - Post catch bridged to Brilinta and restarted Losartan 50mg  - 1hr post cath pt experienced single episode CP 5/10 described as worse than his anginal pain - given 0.4 Sublingual Nitro x1, no other episodes of Anginal Chest Pain  - will be d/c on Toprol XL 25mg daily, Atorvastatin 80mg at bedtime,  ASA 81mg daily, Brilinta 90mg BID and Losartan 50mg daily    # HTN Pt presented /98 on Toprol 25mg qd and Losartan 50mg qd  - Toprol restarted post PCI  - losartan was held for 24hrs post PCI to reduce risk of DEANNE   - d/c on Toprol 25mg qd and Losartan 50mg qd    #PCI Hematoma R Radial wrist  - in the setting of 2 doses of Integrillin during PCI  - soft hematoma, purple flat in appearence- distal pulse palpated and warm w/ 5/5 strength and full rom  - echymosis expanding beyond margins - resolving     GI:  #HLD   - Home med Crestor 20mg  - started on Atorvastatin 80mg at bedtime post PCI     New medications: Atorvastatin 80mg, Brilinta 90mg BID  Labs to be followed outpatient:   Exam to be followed outpatient:    74 former smoker M PMHx COVID April 2020 not requiring hospitalization, HTN & HLD presented to ER this am c/o 4/10 mid-sternal chest pain radiating around back between shoulders blades.    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 neg EKG NSR  - 11/25 TTE EF 50-55% and mild MR, mild-mod TR  - Pre Cath: ASA 324mg given, given loading dose 600mg, continued Toprol XL 25mg daily, Atorvastatin 80mg,  ASA 81mg daily in patient    - Post catch bridged to Brilinta and restarted Losartan 50mg  - 1hr post cath pt experienced single episode CP 5/10 described as worse than his anginal pain - given 0.4 Sublingual Nitro x1, no other episodes of Anginal Chest Pain  -  d/c on Toprol XL 25mg daily, Atorvastatin 80mg at bedtime,  ASA 81mg daily, Brilinta 90mg BID and Losartan 50mg daily    # HTN Pt presented /98 on Toprol 25mg qd and Losartan 50mg qd  - Toprol restarted post PCI  - losartan was held for 24hrs post PCI to reduce risk of DEANNE   - d/c on meds as above    #PCI Hematoma R Radial wrist  - in the setting of 2 doses of Integrillin during PCI  - soft hematoma, purple flat in appearence- distal pulse palpated and warm w/ 5/5 strength and full rom  - echymosis expanding beyond margins - resolving     #HLD   - Home med Crestor 20mg discontinued  - started on High dose statin d/t CAD: Atorvastatin 80mg at bedtime   - dc on Atorvastatin 80mg    New medications: Atorvastatin 80mg, Brilinta 90mg BID  Labs to be followed outpatient:   Exam to be followed outpatient:    74 former smoker M PMHx COVID April 2020 not requiring hospitalization, HTN & HLD presented to ER this am c/o 4/10 mid-sternal chest pain radiating around back between shoulders blades. Pt had seen Dr. Astudillo that day prior who recommended Cath in setting of CCTA showing 99% occlusion of Ramus intermedius. Pt decided to plan for elective cath and was advised to got to ER if the pain returns. Pt experienced anginal pain 11/25 AM and was taken to Cath lab. In ED EKG NSR w/ neg trops. In Cath lab, Successful PCI of 95% stenosis of main ramus vessel with a 2.75x16 mm Promus Elite CHICO. 0% residual stenosis and excellent angiographic result with HANNAH 3 flow post intervention as well as successful PTCA of 99% stenosis of branch segment of ramus vessel with a 1.2x12  balloon with residual 60% stenosis and HANNAH 3 flow post intervention. Post cath Hematoma formed over R raidal wrist that resolved o/n. Pt is stable and medically cleared for discharge to home.    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 neg EKG NSR  - 11/25 TTE EF 50-55% and mild MR, mild-mod TR  - Pre Cath: ASA 324mg given, given loading dose 600mg, continued Toprol XL 25mg daily, Atorvastatin 80mg,  ASA 81mg daily in patient    - Post cath: bridged to Brilinta and restarted Losartan 50mg  - 1hr post cath pt experienced single episode CP 5/10 described as worse than his anginal pain - given 0.4 Sublingual Nitro x1, no other episodes of Anginal Chest Pain  -  d/c on Toprol XL 25mg daily, Atorvastatin 80mg at bedtime,  ASA 81mg daily, Brilinta 90mg BID and Losartan 50mg daily    # HTN Pt presented /98 on Toprol 25mg qd and Losartan 50mg qd  - Toprol restarted post PCI  - losartan was held for 24hrs post PCI to reduce risk of DEANNE   - d/c on meds as above    #PCI Hematoma R Radial wrist  - in the setting of 2 doses of Integrillin during PCI  - soft hematoma, purple flat in appearence- distal pulse palpated and warm w/ 5/5 strength and full rom  - echymosis expanding beyond margins - resolving     #HLD   - Home med Crestor 20mg discontinued  - started on High dose statin d/t CAD: Atorvastatin 80mg at bedtime   - dc on Atorvastatin 80mg    New medications: Atorvastatin 80mg, Brilinta 90mg BID  Labs to be followed outpatient: rec f/u to cardiologist  Exam to be followed outpatient: rec f/u to cardiologist

## 2020-11-30 PROBLEM — E78.5 HYPERLIPIDEMIA, UNSPECIFIED: Chronic | Status: ACTIVE | Noted: 2020-11-25

## 2020-11-30 PROBLEM — I10 ESSENTIAL (PRIMARY) HYPERTENSION: Chronic | Status: ACTIVE | Noted: 2020-11-25

## 2020-12-03 DIAGNOSIS — I20.0 UNSTABLE ANGINA: ICD-10-CM

## 2020-12-03 DIAGNOSIS — Z87.891 PERSONAL HISTORY OF NICOTINE DEPENDENCE: ICD-10-CM

## 2020-12-03 DIAGNOSIS — Z79.02 LONG TERM (CURRENT) USE OF ANTITHROMBOTICS/ANTIPLATELETS: ICD-10-CM

## 2020-12-03 DIAGNOSIS — I25.110 ATHEROSCLEROTIC HEART DISEASE OF NATIVE CORONARY ARTERY WITH UNSTABLE ANGINA PECTORIS: ICD-10-CM

## 2020-12-03 DIAGNOSIS — R07.9 CHEST PAIN, UNSPECIFIED: ICD-10-CM

## 2020-12-03 DIAGNOSIS — Z98.42 CATARACT EXTRACTION STATUS, LEFT EYE: ICD-10-CM

## 2020-12-03 DIAGNOSIS — E78.5 HYPERLIPIDEMIA, UNSPECIFIED: ICD-10-CM
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
## 2020-12-03 DIAGNOSIS — Z98.41 CATARACT EXTRACTION STATUS, RIGHT EYE: ICD-10-CM

## 2020-12-03 DIAGNOSIS — I10 ESSENTIAL (PRIMARY) HYPERTENSION: ICD-10-CM
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
## 2020-12-03 DIAGNOSIS — L76.31 POSTPROCEDURAL HEMATOMA OF SKIN AND SUBCUTANEOUS TISSUE FOLLOWING A DERMATOLOGIC PROCEDURE: ICD-10-CM

## 2020-12-03 DIAGNOSIS — Z82.49 FAMILY HISTORY OF ISCHEMIC HEART DISEASE AND OTHER DISEASES OF THE CIRCULATORY SYSTEM: ICD-10-CM

## 2020-12-03 DIAGNOSIS — Z86.19 PERSONAL HISTORY OF OTHER INFECTIOUS AND PARASITIC DISEASES: ICD-10-CM

## 2020-12-04 ENCOUNTER — APPOINTMENT (OUTPATIENT)
Dept: HEART AND VASCULAR | Facility: CLINIC | Age: 75
End: 2020-12-04
Payer: MEDICARE

## 2020-12-04 ENCOUNTER — NON-APPOINTMENT (OUTPATIENT)
Age: 75
End: 2020-12-04

## 2020-12-04 VITALS
DIASTOLIC BLOOD PRESSURE: 56 MMHG | HEIGHT: 69 IN | HEART RATE: 80 BPM | BODY MASS INDEX: 19.85 KG/M2 | TEMPERATURE: 98 F | WEIGHT: 134 LBS | OXYGEN SATURATION: 98 % | SYSTOLIC BLOOD PRESSURE: 106 MMHG

## 2020-12-04 PROCEDURE — 99072 ADDL SUPL MATRL&STAF TM PHE: CPT

## 2020-12-04 PROCEDURE — 99214 OFFICE O/P EST MOD 30 MIN: CPT

## 2020-12-04 PROCEDURE — 93000 ELECTROCARDIOGRAM COMPLETE: CPT

## 2020-12-04 RX ORDER — ATORVASTATIN CALCIUM 80 MG/1
80 TABLET, FILM COATED ORAL DAILY
Qty: 90 | Refills: 3 | Status: DISCONTINUED | COMMUNITY
End: 2020-12-04

## 2020-12-04 RX ORDER — ROSUVASTATIN CALCIUM 20 MG/1
20 TABLET, FILM COATED ORAL
Qty: 90 | Refills: 2 | Status: DISCONTINUED | COMMUNITY
Start: 2020-12-04 | End: 2020-12-04

## 2020-12-05 NOTE — HISTORY OF PRESENT ILLNESS
[FreeTextEntry1] : 74 M COVID newly diagnosed HTN with CP now s/p stent to Ramus 11/2020 here for fu chest pain is gone but is having shortness of breath which is getting better\par \par \par \par ecg NSR \par \par

## 2020-12-05 NOTE — ASSESSMENT
[FreeTextEntry1] : CAD on GDMT\par Htn controlled\par sob likely brilinta will give it additional three weeks if no resolution switch to plavix

## 2020-12-14 PROCEDURE — 99285 EMERGENCY DEPT VISIT HI MDM: CPT

## 2020-12-14 PROCEDURE — 85027 COMPLETE CBC AUTOMATED: CPT

## 2020-12-14 PROCEDURE — 36415 COLL VENOUS BLD VENIPUNCTURE: CPT

## 2020-12-14 PROCEDURE — 83036 HEMOGLOBIN GLYCOSYLATED A1C: CPT

## 2020-12-14 PROCEDURE — C1887: CPT

## 2020-12-14 PROCEDURE — 82550 ASSAY OF CK (CPK): CPT

## 2020-12-14 PROCEDURE — 80053 COMPREHEN METABOLIC PANEL: CPT

## 2020-12-14 PROCEDURE — 71046 X-RAY EXAM CHEST 2 VIEWS: CPT

## 2020-12-14 PROCEDURE — 82248 BILIRUBIN DIRECT: CPT

## 2020-12-14 PROCEDURE — 71045 X-RAY EXAM CHEST 1 VIEW: CPT

## 2020-12-14 PROCEDURE — C1725: CPT

## 2020-12-14 PROCEDURE — 80061 LIPID PANEL: CPT

## 2020-12-14 PROCEDURE — 85730 THROMBOPLASTIN TIME PARTIAL: CPT

## 2020-12-14 PROCEDURE — 93306 TTE W/DOPPLER COMPLETE: CPT

## 2020-12-14 PROCEDURE — C1874: CPT

## 2020-12-14 PROCEDURE — C1769: CPT

## 2020-12-14 PROCEDURE — 87635 SARS-COV-2 COVID-19 AMP PRB: CPT

## 2020-12-14 PROCEDURE — 86803 HEPATITIS C AB TEST: CPT

## 2020-12-14 PROCEDURE — 84100 ASSAY OF PHOSPHORUS: CPT

## 2020-12-14 PROCEDURE — 82553 CREATINE MB FRACTION: CPT

## 2020-12-14 PROCEDURE — 83735 ASSAY OF MAGNESIUM: CPT

## 2020-12-14 PROCEDURE — 85610 PROTHROMBIN TIME: CPT

## 2020-12-14 PROCEDURE — 84484 ASSAY OF TROPONIN QUANT: CPT

## 2020-12-14 PROCEDURE — 85025 COMPLETE CBC W/AUTO DIFF WBC: CPT

## 2020-12-14 PROCEDURE — C1894: CPT

## 2020-12-24 RX ORDER — TICAGRELOR 90 MG/1
90 TABLET ORAL
Qty: 180 | Refills: 2 | Status: DISCONTINUED | COMMUNITY
End: 2020-12-24

## 2020-12-30 RX ORDER — LOSARTAN POTASSIUM 50 MG/1
50 TABLET, FILM COATED ORAL DAILY
Qty: 90 | Refills: 3 | Status: DISCONTINUED | COMMUNITY
Start: 2020-11-11 | End: 2020-12-30

## 2021-01-05 ENCOUNTER — NON-APPOINTMENT (OUTPATIENT)
Age: 76
End: 2021-01-05

## 2021-01-05 ENCOUNTER — APPOINTMENT (OUTPATIENT)
Dept: HEART AND VASCULAR | Facility: CLINIC | Age: 76
End: 2021-01-05
Payer: MEDICARE

## 2021-01-05 VITALS
OXYGEN SATURATION: 99 % | HEIGHT: 69 IN | DIASTOLIC BLOOD PRESSURE: 68 MMHG | HEART RATE: 72 BPM | TEMPERATURE: 98.4 F | BODY MASS INDEX: 19.55 KG/M2 | WEIGHT: 131.99 LBS | SYSTOLIC BLOOD PRESSURE: 134 MMHG

## 2021-01-05 DIAGNOSIS — R51.9 HEADACHE, UNSPECIFIED: ICD-10-CM

## 2021-01-05 DIAGNOSIS — K21.9 GASTRO-ESOPHAGEAL REFLUX DISEASE W/OUT ESOPHAGITIS: ICD-10-CM

## 2021-01-05 PROCEDURE — 99072 ADDL SUPL MATRL&STAF TM PHE: CPT

## 2021-01-05 PROCEDURE — 93000 ELECTROCARDIOGRAM COMPLETE: CPT

## 2021-01-05 PROCEDURE — 99214 OFFICE O/P EST MOD 30 MIN: CPT

## 2021-01-06 NOTE — ASSESSMENT
[FreeTextEntry1] : CAD on GDMT off losartan due to hypotension and BP in the 60's\par Htn controlled\par sob better on plavix\par headache will do ct head given he is on plavix\par fu in one month\par GI symptoms likely gerd short course of PPI to see GI lifestyle discussed

## 2021-01-06 NOTE — HISTORY OF PRESENT ILLNESS
[FreeTextEntry1] : 75 M COVID newly diagnosed HTN with CP now s/p stent to Ramus 11/2020 here for fu sob with brilinta sob has gotten better off losartan due to hypotension he is complaining of epigastric pain "burning" with gas pains took tums noticed having abdominal distension eats spicy foods no other GI symptoms also complaining of headache dull ache never had headaches in the past for last two weeks no head trauma\par \par \par ecg NSR \par \par

## 2021-01-19 ENCOUNTER — RX RENEWAL (OUTPATIENT)
Age: 76
End: 2021-01-19

## 2021-03-12 ENCOUNTER — EMERGENCY (EMERGENCY)
Facility: HOSPITAL | Age: 76
LOS: 1 days | Discharge: HOME | End: 2021-03-12
Attending: EMERGENCY MEDICINE | Admitting: EMERGENCY MEDICINE
Payer: MEDICARE

## 2021-03-12 VITALS
HEART RATE: 78 BPM | SYSTOLIC BLOOD PRESSURE: 168 MMHG | OXYGEN SATURATION: 98 % | RESPIRATION RATE: 22 BRPM | HEIGHT: 60 IN | DIASTOLIC BLOOD PRESSURE: 88 MMHG | TEMPERATURE: 99 F

## 2021-03-12 DIAGNOSIS — Z79.82 LONG TERM (CURRENT) USE OF ASPIRIN: ICD-10-CM

## 2021-03-12 DIAGNOSIS — Z90.49 ACQUIRED ABSENCE OF OTHER SPECIFIED PARTS OF DIGESTIVE TRACT: Chronic | ICD-10-CM

## 2021-03-12 DIAGNOSIS — I10 ESSENTIAL (PRIMARY) HYPERTENSION: ICD-10-CM

## 2021-03-12 DIAGNOSIS — R42 DIZZINESS AND GIDDINESS: ICD-10-CM

## 2021-03-12 DIAGNOSIS — L72.9 FOLLICULAR CYST OF THE SKIN AND SUBCUTANEOUS TISSUE, UNSPECIFIED: Chronic | ICD-10-CM

## 2021-03-12 DIAGNOSIS — Z98.49 CATARACT EXTRACTION STATUS, UNSPECIFIED EYE: Chronic | ICD-10-CM

## 2021-03-12 DIAGNOSIS — R07.89 OTHER CHEST PAIN: ICD-10-CM

## 2021-03-12 DIAGNOSIS — Z79.899 OTHER LONG TERM (CURRENT) DRUG THERAPY: ICD-10-CM

## 2021-03-12 DIAGNOSIS — E78.5 HYPERLIPIDEMIA, UNSPECIFIED: ICD-10-CM

## 2021-03-12 DIAGNOSIS — Z20.822 CONTACT WITH AND (SUSPECTED) EXPOSURE TO COVID-19: ICD-10-CM

## 2021-03-12 PROCEDURE — 93010 ELECTROCARDIOGRAM REPORT: CPT

## 2021-03-12 PROCEDURE — 99285 EMERGENCY DEPT VISIT HI MDM: CPT | Mod: 25

## 2021-03-12 PROCEDURE — 71045 X-RAY EXAM CHEST 1 VIEW: CPT | Mod: 26

## 2021-03-12 NOTE — ED ADULT NURSE NOTE - OBJECTIVE STATEMENT
Pt presents to ER c/o intermittent left chest "4/10 pressure" pain radiating to back with dizziness for ~1 week. Pt denies any SOB, palpitations, HA, fever/chills, abdominal pain, N/V, urinary symptoms or weakness at this time.

## 2021-03-13 VITALS
RESPIRATION RATE: 18 BRPM | DIASTOLIC BLOOD PRESSURE: 72 MMHG | SYSTOLIC BLOOD PRESSURE: 129 MMHG | HEART RATE: 72 BPM | OXYGEN SATURATION: 98 %

## 2021-03-13 LAB
ALBUMIN SERPL ELPH-MCNC: 4.1 G/DL — SIGNIFICANT CHANGE UP (ref 3.3–5)
ALP SERPL-CCNC: 100 U/L — SIGNIFICANT CHANGE UP (ref 40–120)
ALT FLD-CCNC: 51 U/L — HIGH (ref 10–45)
ANION GAP SERPL CALC-SCNC: 10 MMOL/L — SIGNIFICANT CHANGE UP (ref 5–17)
APTT BLD: 28.2 SEC — SIGNIFICANT CHANGE UP (ref 27.5–35.5)
AST SERPL-CCNC: 34 U/L — SIGNIFICANT CHANGE UP (ref 10–40)
BASOPHILS # BLD AUTO: 0.02 K/UL — SIGNIFICANT CHANGE UP (ref 0–0.2)
BASOPHILS NFR BLD AUTO: 0.4 % — SIGNIFICANT CHANGE UP (ref 0–2)
BILIRUB SERPL-MCNC: 0.5 MG/DL — SIGNIFICANT CHANGE UP (ref 0.2–1.2)
BUN SERPL-MCNC: 15 MG/DL — SIGNIFICANT CHANGE UP (ref 7–23)
CALCIUM SERPL-MCNC: 8.7 MG/DL — SIGNIFICANT CHANGE UP (ref 8.4–10.5)
CHLORIDE SERPL-SCNC: 106 MMOL/L — SIGNIFICANT CHANGE UP (ref 96–108)
CO2 SERPL-SCNC: 26 MMOL/L — SIGNIFICANT CHANGE UP (ref 22–31)
CREAT SERPL-MCNC: 1.02 MG/DL — SIGNIFICANT CHANGE UP (ref 0.5–1.3)
EOSINOPHIL # BLD AUTO: 0.17 K/UL — SIGNIFICANT CHANGE UP (ref 0–0.5)
EOSINOPHIL NFR BLD AUTO: 3.3 % — SIGNIFICANT CHANGE UP (ref 0–6)
GLUCOSE SERPL-MCNC: 101 MG/DL — HIGH (ref 70–99)
HCT VFR BLD CALC: 44 % — SIGNIFICANT CHANGE UP (ref 39–50)
HGB BLD-MCNC: 14.7 G/DL — SIGNIFICANT CHANGE UP (ref 13–17)
IMM GRANULOCYTES NFR BLD AUTO: 0.2 % — SIGNIFICANT CHANGE UP (ref 0–1.5)
INR BLD: 1.1 — SIGNIFICANT CHANGE UP (ref 0.88–1.16)
LIDOCAIN IGE QN: 122 U/L — HIGH (ref 7–60)
LYMPHOCYTES # BLD AUTO: 1.92 K/UL — SIGNIFICANT CHANGE UP (ref 1–3.3)
LYMPHOCYTES # BLD AUTO: 36.8 % — SIGNIFICANT CHANGE UP (ref 13–44)
MAGNESIUM SERPL-MCNC: 2.1 MG/DL — SIGNIFICANT CHANGE UP (ref 1.6–2.6)
MCHC RBC-ENTMCNC: 30.3 PG — SIGNIFICANT CHANGE UP (ref 27–34)
MCHC RBC-ENTMCNC: 33.4 GM/DL — SIGNIFICANT CHANGE UP (ref 32–36)
MCV RBC AUTO: 90.7 FL — SIGNIFICANT CHANGE UP (ref 80–100)
MONOCYTES # BLD AUTO: 0.65 K/UL — SIGNIFICANT CHANGE UP (ref 0–0.9)
MONOCYTES NFR BLD AUTO: 12.5 % — SIGNIFICANT CHANGE UP (ref 2–14)
NEUTROPHILS # BLD AUTO: 2.45 K/UL — SIGNIFICANT CHANGE UP (ref 1.8–7.4)
NEUTROPHILS NFR BLD AUTO: 46.8 % — SIGNIFICANT CHANGE UP (ref 43–77)
NRBC # BLD: 0 /100 WBCS — SIGNIFICANT CHANGE UP (ref 0–0)
PLATELET # BLD AUTO: 161 K/UL — SIGNIFICANT CHANGE UP (ref 150–400)
POTASSIUM SERPL-MCNC: 4 MMOL/L — SIGNIFICANT CHANGE UP (ref 3.5–5.3)
POTASSIUM SERPL-SCNC: 4 MMOL/L — SIGNIFICANT CHANGE UP (ref 3.5–5.3)
PROT SERPL-MCNC: 6.8 G/DL — SIGNIFICANT CHANGE UP (ref 6–8.3)
PROTHROM AB SERPL-ACNC: 13.1 SEC — SIGNIFICANT CHANGE UP (ref 10.6–13.6)
RBC # BLD: 4.85 M/UL — SIGNIFICANT CHANGE UP (ref 4.2–5.8)
RBC # FLD: 13.2 % — SIGNIFICANT CHANGE UP (ref 10.3–14.5)
SARS-COV-2 RNA SPEC QL NAA+PROBE: SIGNIFICANT CHANGE UP
SODIUM SERPL-SCNC: 142 MMOL/L — SIGNIFICANT CHANGE UP (ref 135–145)
TROPONIN T SERPL-MCNC: 0.01 NG/ML — SIGNIFICANT CHANGE UP (ref 0–0.01)
WBC # BLD: 5.22 K/UL — SIGNIFICANT CHANGE UP (ref 3.8–10.5)
WBC # FLD AUTO: 5.22 K/UL — SIGNIFICANT CHANGE UP (ref 3.8–10.5)

## 2021-03-13 PROCEDURE — 75635 CT ANGIO ABDOMINAL ARTERIES: CPT | Mod: 26,MC

## 2021-03-13 PROCEDURE — U0003: CPT

## 2021-03-13 PROCEDURE — 71275 CT ANGIOGRAPHY CHEST: CPT | Mod: 26,MC

## 2021-03-13 PROCEDURE — 85730 THROMBOPLASTIN TIME PARTIAL: CPT

## 2021-03-13 PROCEDURE — 96374 THER/PROPH/DIAG INJ IV PUSH: CPT

## 2021-03-13 PROCEDURE — 71275 CT ANGIOGRAPHY CHEST: CPT

## 2021-03-13 PROCEDURE — 71045 X-RAY EXAM CHEST 1 VIEW: CPT | Mod: 26

## 2021-03-13 PROCEDURE — 71045 X-RAY EXAM CHEST 1 VIEW: CPT

## 2021-03-13 PROCEDURE — U0005: CPT

## 2021-03-13 PROCEDURE — 85610 PROTHROMBIN TIME: CPT

## 2021-03-13 PROCEDURE — 75635 CT ANGIO ABDOMINAL ARTERIES: CPT

## 2021-03-13 PROCEDURE — 80053 COMPREHEN METABOLIC PANEL: CPT

## 2021-03-13 PROCEDURE — 85025 COMPLETE CBC W/AUTO DIFF WBC: CPT

## 2021-03-13 PROCEDURE — 99284 EMERGENCY DEPT VISIT MOD MDM: CPT | Mod: 25

## 2021-03-13 PROCEDURE — 93005 ELECTROCARDIOGRAM TRACING: CPT

## 2021-03-13 PROCEDURE — 84484 ASSAY OF TROPONIN QUANT: CPT

## 2021-03-13 PROCEDURE — 83690 ASSAY OF LIPASE: CPT

## 2021-03-13 PROCEDURE — 83735 ASSAY OF MAGNESIUM: CPT

## 2021-03-13 PROCEDURE — 36415 COLL VENOUS BLD VENIPUNCTURE: CPT

## 2021-03-13 RX ORDER — ACETAMINOPHEN 500 MG
1000 TABLET ORAL ONCE
Refills: 0 | Status: COMPLETED | OUTPATIENT
Start: 2021-03-13 | End: 2021-03-13

## 2021-03-13 RX ADMIN — Medication 1000 MILLIGRAM(S): at 01:38

## 2021-03-13 RX ADMIN — Medication 400 MILLIGRAM(S): at 01:38

## 2021-03-13 RX ADMIN — Medication 1000 MILLIGRAM(S): at 02:08

## 2021-03-13 NOTE — ED PROVIDER NOTE - NSFOLLOWUPINSTRUCTIONS_ED_ALL_ED_FT
CHEST PAIN - AfterCare(R) Instructions(ER/ED)           Chest Pain    WHAT YOU NEED TO KNOW:    Chest pain can be caused by a range of conditions, from not serious to life-threatening. Chest pain can be a symptom of a digestive problem, such as acid reflux or a stomach ulcer. An anxiety attack or a strong emotion, such as anger, can also cause chest pain. Infection, inflammation, or a fracture in the bones or cartilage in your chest can cause pain or discomfort. Sometimes chest pain or pressure is caused by poor blood flow to your heart (angina). Chest pain may also be caused by life-threatening conditions such as a heart attack or blood clot in your lungs.    DISCHARGE INSTRUCTIONS:    Call your local emergency number (911 in the [place]) or have someone call if:   •You have any of the following signs of a heart attack: ?Squeezing, pressure, or pain in your chest      ?You may also have any of the following: ?Discomfort or pain in your back, neck, jaw, stomach, or arm      ?Shortness of breath      ?Nausea or vomiting      ?Lightheadedness or a sudden cold sweat            Return to the emergency department if:   •You have chest discomfort that gets worse, even with medicine.      •You cough or vomit blood.      •Your bowel movements are black or bloody.      •You cannot stop vomiting, or it hurts to swallow.      Call your doctor if:   •You have questions or concerns about your condition or care.          Medicines:   •Medicines may be given to treat the cause of your chest pain. Examples include pain medicine, anxiety medicine, or medicines to increase blood flow to your heart.      •Do not take certain medicines without asking your healthcare provider first. These include NSAIDs, herbal or vitamin supplements, or hormones (estrogen or progestin).      •Take your medicine as directed. Contact your healthcare provider if you think your medicine is not helping or if you have side effects. Tell him or her if you are allergic to any medicine. Keep a list of the medicines, vitamins, and herbs you take. Include the amounts, and when and why you take them. Bring the list or the pill bottles to follow-up visits. Carry your medicine list with you in case of an emergency.      Healthy living tips: The following are general healthy guidelines. If the cause of your chest pain is known, your healthcare provider will give you specific guidelines to follow.  •Do not smoke. Nicotine and other chemicals in cigarettes and cigars can cause lung and heart damage. Ask your healthcare provider for information if you currently smoke and need help to quit. E-cigarettes or smokeless tobacco still contain nicotine. Talk to your healthcare provider before you use these products.      •Choose a variety of healthy foods as often as possible. Include fresh, frozen, or canned fruits and vegetables. Also include low-fat dairy products, fish, chicken (without skin), and lean meats. Your healthcare provider or a dietitian can help you create meal plans. You may need to avoid certain foods or drinks if your pain is caused by a digestion problem.  Healthy Foods           •Lower your sodium (salt) intake. Limit foods that are high in sodium, such as canned foods, salty snacks, and cold cuts. If you add salt when you cook food, do not add more at the table. Choose low-sodium canned foods as much as possible.             •Drink plenty of water every day. Water helps your body to control your temperature and blood pressure. Ask your healthcare provider how much water you should drink every day.      •Ask about activity. Your healthcare provider will tell you which activities to limit or avoid. Ask when you can drive, return to work, and have sex. Ask about the best exercise plan for you.      •Maintain a healthy weight. Ask your healthcare provider what a healthy weight is for you. Ask him or her to help you create a safe weight loss plan if you are overweight.      •Ask about vaccines you may need. Get the influenza (flu) vaccine every year as soon as recommended, usually in September or October. You may also need a pneumococcal vaccine to prevent pneumonia. The vaccine is usually given every 5 years, starting at age 65. Your healthcare provider can tell you if should get other vaccines, and when to get them.      Follow up with your healthcare provider within 72 hours, or as directed: You may need to return for more tests to find the cause of your chest pain. You may be referred to a specialist, such as a cardiologist or gastroenterologist. Write down your questions so you remember to ask them during your visits.

## 2021-03-13 NOTE — ED PROVIDER NOTE - CLINICAL SUMMARY MEDICAL DECISION MAKING FREE TEXT BOX
Pt is a 75M with chest pain radiating to back. WIll check labs, CXR, EKG. Will check CTA dissection study.  Case discussed with Dr. Astudillo, will follow pt.

## 2021-03-13 NOTE — ED PROVIDER NOTE - OBJECTIVE STATEMENT
Pt is a 75M who presents to ED for chest pain radiating to the back between the shoulder blades. Pt states he has been having pain for the past week. Pt has significant hx of stent placement in Nov 2020. Pt states also has associated head pressure and lightheadedness. Pt well appearing in ED, in NAD. No SOB, no numbness or tingling. Vitals stable. Pt on Plavix and ASA

## 2021-03-13 NOTE — ED PROVIDER NOTE - PATIENT PORTAL LINK FT
You can access the FollowMyHealth Patient Portal offered by [facility] by registering at the following website: [email or web address]. By joining [employer]’s FollowMyHealth portal, you will also be able to view your health information using other applications (apps) compatible with our system.

## 2021-03-13 NOTE — ED PROVIDER NOTE - CARE PROVIDER_API CALL
Juan A Astudillo  CARDIOLOGY  [address] [place] [address]  Phone: [phone]  Fax: [phone]  Follow Up Time:

## 2021-03-13 NOTE — ED ADULT NURSE REASSESSMENT NOTE - NS ED NURSE REASSESS COMMENT FT1
Pt arrives via EMS to ER room 2 c/o intermittent left chest "4/10 pressure" pain radiating to back with dizziness for ~1 week. Pt denies any SOB, palpitations, HA, fever/chills, abdominal pain, N/V, urinary symptoms or weakness at this time. Pt placed on cardiac monitor, NIBP and SpO2, IV access established, labs drawn and sent, waiting results. EKG complete, presented to ER physician for interpretation. Assessment as noted.

## 2021-03-17 ENCOUNTER — APPOINTMENT (OUTPATIENT)
Dept: HEART AND VASCULAR | Facility: CLINIC | Age: 76
End: 2021-03-17
Payer: MEDICARE

## 2021-03-17 ENCOUNTER — NON-APPOINTMENT (OUTPATIENT)
Age: 76
End: 2021-03-17

## 2021-03-17 VITALS
TEMPERATURE: 98.2 F | SYSTOLIC BLOOD PRESSURE: 132 MMHG | HEIGHT: 69 IN | BODY MASS INDEX: 19.85 KG/M2 | OXYGEN SATURATION: 98 % | HEART RATE: 71 BPM | WEIGHT: 134 LBS | DIASTOLIC BLOOD PRESSURE: 70 MMHG

## 2021-03-17 PROCEDURE — 93000 ELECTROCARDIOGRAM COMPLETE: CPT

## 2021-03-17 PROCEDURE — 99214 OFFICE O/P EST MOD 30 MIN: CPT

## 2021-03-17 PROCEDURE — 99072 ADDL SUPL MATRL&STAF TM PHE: CPT

## 2021-03-17 RX ORDER — ATORVASTATIN CALCIUM 80 MG/1
80 TABLET, FILM COATED ORAL DAILY
Qty: 90 | Refills: 3 | Status: DISCONTINUED | COMMUNITY
Start: 2020-12-04 | End: 2021-03-17

## 2021-03-17 NOTE — ASSESSMENT
[FreeTextEntry1] : He is dizzy and blood pressure is low i will cut back on metoprolol 12.5 mg once a day take at bedtime\par change his statin to rosuvastatin \par He will see me in one month to see if his symptoms improve\par CAD stay on plavix and aspirin \par

## 2021-03-17 NOTE — HISTORY OF PRESENT ILLNESS
[FreeTextEntry1] : 75 M COVID stent to Ramus 11/2020 in the settting of stable chest pain was on losartan in the past was stopped due to hypotension \par \par 3/17/2021 feels sob nausea dizzy was in the ED with pain radiating to his back work up was negative feels that he is drunk he feels sob when he does some activity his head is not clear he cannot focus properly memory is not good dizziness is worse when he gets up and starts walking around \par \par ecg nsr

## 2021-04-06 ENCOUNTER — RX RENEWAL (OUTPATIENT)
Age: 76
End: 2021-04-06

## 2021-04-21 ENCOUNTER — APPOINTMENT (OUTPATIENT)
Dept: HEART AND VASCULAR | Facility: CLINIC | Age: 76
End: 2021-04-21

## 2021-05-25 ENCOUNTER — APPOINTMENT (OUTPATIENT)
Dept: HEART AND VASCULAR | Facility: CLINIC | Age: 76
End: 2021-05-25
Payer: MEDICARE

## 2021-05-25 VITALS
OXYGEN SATURATION: 98 % | SYSTOLIC BLOOD PRESSURE: 129 MMHG | TEMPERATURE: 98.5 F | DIASTOLIC BLOOD PRESSURE: 64 MMHG | HEART RATE: 75 BPM | BODY MASS INDEX: 19.99 KG/M2 | HEIGHT: 69 IN | WEIGHT: 134.99 LBS

## 2021-05-25 PROCEDURE — 99213 OFFICE O/P EST LOW 20 MIN: CPT

## 2021-05-25 PROCEDURE — 99072 ADDL SUPL MATRL&STAF TM PHE: CPT

## 2021-05-25 PROCEDURE — 36415 COLL VENOUS BLD VENIPUNCTURE: CPT

## 2021-05-25 NOTE — HISTORY OF PRESENT ILLNESS
[FreeTextEntry1] : 75 M COVID stent to Ramus 11/2020 in the settting of stable chest pain was on losartan in the past was stopped due to hypotension \par \par 3/17/2021 feels sob nausea dizzy was in the ED with pain radiating to his back work up was negative feels that he is drunk he feels sob when he does some activity his head is not clear he cannot focus properly memory is not good dizziness is worse when he gets up and starts walking around \par \par 5/25/2021 he is doing much better with current regimen no sob he is doing well with cardiac rehab \par \par ecg nsr

## 2021-05-25 NOTE — ASSESSMENT
[FreeTextEntry1] : sob much better likely due to brilinta\par intolerant to arb due to hypotension\par CAD stay on plavix and aspirin \par fu in six months

## 2021-05-25 NOTE — REASON FOR VISIT
[Coronary Artery Disease] : coronary artery disease [Follow-Up - Clinic] : a clinic follow-up of [FreeTextEntry2] : shortness of breath

## 2021-05-25 NOTE — PHYSICAL EXAM
[Well Developed] : well developed [Well Nourished] : well nourished [No Acute Distress] : no acute distress [Normal Venous Pressure] : normal venous pressure [No Carotid Bruit] : no carotid bruit [Normal S1, S2] : normal S1, S2 [No Murmur] : no murmur [No Rub] : no rub [No Gallop] : no gallop [Clear Lung Fields] : clear lung fields [Good Air Entry] : good air entry [No Respiratory Distress] : no respiratory distress  [Soft] : abdomen soft [Non Tender] : non-tender [No Masses/organomegaly] : no masses/organomegaly [Normal Bowel Sounds] : normal bowel sounds [Normal Gait] : normal gait [No Edema] : no edema [No Cyanosis] : no cyanosis [No Clubbing] : no clubbing [No Varicosities] : no varicosities [No Rash] : no rash [No Skin Lesions] : no skin lesions [Moves all extremities] : moves all extremities [No Focal Deficits] : no focal deficits [Normal Speech] : normal speech [Alert and Oriented] : alert and oriented [Normal memory] : normal memory [General Appearance - Well Developed] : well developed [Normal Appearance] : normal appearance [Well Groomed] : well groomed [General Appearance - Well Nourished] : well nourished [No Deformities] : no deformities [General Appearance - In No Acute Distress] : no acute distress [Normal Conjunctiva] : the conjunctiva exhibited no abnormalities [Eyelids - No Xanthelasma] : the eyelids demonstrated no xanthelasmas [Normal Oral Mucosa] : normal oral mucosa [No Oral Pallor] : no oral pallor [No Oral Cyanosis] : no oral cyanosis [Normal Jugular Venous A Waves Present] : normal jugular venous A waves present [Normal Jugular Venous V Waves Present] : normal jugular venous V waves present [No Jugular Venous Quiñones A Waves] : no jugular venous quiñones A waves [Respiration, Rhythm And Depth] : normal respiratory rhythm and effort [Exaggerated Use Of Accessory Muscles For Inspiration] : no accessory muscle use [Auscultation Breath Sounds / Voice Sounds] : lungs were clear to auscultation bilaterally [Heart Rate And Rhythm] : heart rate and rhythm were normal [Heart Sounds] : normal S1 and S2 [Murmurs] : no murmurs present [Abdomen Soft] : soft [Abdomen Tenderness] : non-tender [Abdomen Mass (___ Cm)] : no abdominal mass palpated [Abnormal Walk] : normal gait [Gait - Sufficient For Exercise Testing] : the gait was sufficient for exercise testing [Nail Clubbing] : no clubbing of the fingernails [Cyanosis, Localized] : no localized cyanosis [Petechial Hemorrhages (___cm)] : no petechial hemorrhages [Skin Color & Pigmentation] : normal skin color and pigmentation [] : no rash [No Venous Stasis] : no venous stasis [Skin Lesions] : no skin lesions [No Skin Ulcers] : no skin ulcer [No Xanthoma] : no  xanthoma was observed [Oriented To Time, Place, And Person] : oriented to person, place, and time [Affect] : the affect was normal [Mood] : the mood was normal [No Anxiety] : not feeling anxious

## 2021-05-26 ENCOUNTER — RX RENEWAL (OUTPATIENT)
Age: 76
End: 2021-05-26

## 2021-05-26 LAB
ALBUMIN SERPL ELPH-MCNC: 4.4 G/DL
ALP BLD-CCNC: 74 U/L
ALT SERPL-CCNC: 33 U/L
ANION GAP SERPL CALC-SCNC: 10 MMOL/L
AST SERPL-CCNC: 27 U/L
BASOPHILS # BLD AUTO: 0.03 K/UL
BASOPHILS NFR BLD AUTO: 0.6 %
BILIRUB SERPL-MCNC: 0.7 MG/DL
BUN SERPL-MCNC: 13 MG/DL
CALCIUM SERPL-MCNC: 9.2 MG/DL
CHLORIDE SERPL-SCNC: 106 MMOL/L
CHOLEST SERPL-MCNC: 148 MG/DL
CO2 SERPL-SCNC: 23 MMOL/L
CREAT SERPL-MCNC: 0.77 MG/DL
EOSINOPHIL # BLD AUTO: 0.27 K/UL
EOSINOPHIL NFR BLD AUTO: 5.7 %
ESTIMATED AVERAGE GLUCOSE: 111 MG/DL
GLUCOSE SERPL-MCNC: 101 MG/DL
HBA1C MFR BLD HPLC: 5.5 %
HCT VFR BLD CALC: 43.3 %
HDLC SERPL-MCNC: 70 MG/DL
HGB BLD-MCNC: 14.9 G/DL
IMM GRANULOCYTES NFR BLD AUTO: 0.2 %
LDLC SERPL CALC-MCNC: 21 MG/DL
LYMPHOCYTES # BLD AUTO: 1.59 K/UL
LYMPHOCYTES NFR BLD AUTO: 33.5 %
MAN DIFF?: NORMAL
MCHC RBC-ENTMCNC: 31.2 PG
MCHC RBC-ENTMCNC: 34.4 GM/DL
MCV RBC AUTO: 90.8 FL
MONOCYTES # BLD AUTO: 0.5 K/UL
MONOCYTES NFR BLD AUTO: 10.5 %
NEUTROPHILS # BLD AUTO: 2.34 K/UL
NEUTROPHILS NFR BLD AUTO: 49.5 %
NONHDLC SERPL-MCNC: 78 MG/DL
PLATELET # BLD AUTO: 182 K/UL
POTASSIUM SERPL-SCNC: 4.5 MMOL/L
PROT SERPL-MCNC: 6.5 G/DL
RBC # BLD: 4.77 M/UL
RBC # FLD: 13.7 %
SODIUM SERPL-SCNC: 138 MMOL/L
TRIGL SERPL-MCNC: 285 MG/DL
WBC # FLD AUTO: 4.74 K/UL

## 2021-05-26 RX ORDER — OMEGA-3-ACID ETHYL ESTERS CAPSULES 1 G/1
1 CAPSULE, LIQUID FILLED ORAL
Qty: 360 | Refills: 2 | Status: ACTIVE | COMMUNITY
Start: 2021-05-26 | End: 1900-01-01

## 2021-06-01 ENCOUNTER — APPOINTMENT (OUTPATIENT)
Dept: HEART AND VASCULAR | Facility: CLINIC | Age: 76
End: 2021-06-01

## 2021-08-05 NOTE — PATIENT PROFILE ADULT - NSPROPOAPRESSUREINJURY_GEN_A_NUR
-discussed the importance of wearing helmets on bicycles  -recommended avoiding trampolines  -car safety reviewed     no

## 2021-08-09 ENCOUNTER — RX RENEWAL (OUTPATIENT)
Age: 76
End: 2021-08-09

## 2021-09-01 ENCOUNTER — APPOINTMENT (OUTPATIENT)
Dept: HEART AND VASCULAR | Facility: CLINIC | Age: 76
End: 2021-09-01
Payer: MEDICARE

## 2021-09-01 VITALS
HEART RATE: 70 BPM | SYSTOLIC BLOOD PRESSURE: 125 MMHG | TEMPERATURE: 98.1 F | DIASTOLIC BLOOD PRESSURE: 71 MMHG | HEIGHT: 69 IN | BODY MASS INDEX: 19.55 KG/M2 | OXYGEN SATURATION: 98 % | WEIGHT: 131.99 LBS

## 2021-09-01 PROCEDURE — 93000 ELECTROCARDIOGRAM COMPLETE: CPT

## 2021-09-01 PROCEDURE — 99214 OFFICE O/P EST MOD 30 MIN: CPT

## 2021-09-01 NOTE — HISTORY OF PRESENT ILLNESS
[FreeTextEntry1] : 75 M COVID stent to Ramus 11/2020 in the settting of stable chest pain was on losartan in the past was stopped due to hypotension \par \par 3/17/2021 feels sob nausea dizzy was in the ED with pain radiating to his back work up was negative feels that he is drunk he feels sob when he does some activity his head is not clear he cannot focus properly memory is not good dizziness is worse when he gets up and starts walking around \par \par 9/1/2021 told by [facility] he has dec blood flow in his left leg  \par \par ecg nsr

## 2021-09-01 NOTE — ASSESSMENT
[FreeTextEntry1] : sob much better likely due to brilinta\par has good preripheral pulses on GDMT\par intolerant to arb due to hypotension\par CAD stay on plavix and aspirin \par elevated TG on omega \par fu in six months

## 2021-12-08 ENCOUNTER — RX RENEWAL (OUTPATIENT)
Age: 76
End: 2021-12-08

## 2021-12-08 ENCOUNTER — NON-APPOINTMENT (OUTPATIENT)
Age: 76
End: 2021-12-08

## 2021-12-08 ENCOUNTER — APPOINTMENT (OUTPATIENT)
Dept: HEART AND VASCULAR | Facility: CLINIC | Age: 76
End: 2021-12-08
Payer: MEDICARE

## 2021-12-08 VITALS
HEIGHT: 69 IN | OXYGEN SATURATION: 99 % | TEMPERATURE: 98.6 F | SYSTOLIC BLOOD PRESSURE: 132 MMHG | WEIGHT: 137.99 LBS | HEART RATE: 69 BPM | BODY MASS INDEX: 20.44 KG/M2 | DIASTOLIC BLOOD PRESSURE: 71 MMHG

## 2021-12-08 DIAGNOSIS — R06.02 SHORTNESS OF BREATH: ICD-10-CM

## 2021-12-08 PROCEDURE — 93000 ELECTROCARDIOGRAM COMPLETE: CPT

## 2021-12-08 PROCEDURE — 99214 OFFICE O/P EST MOD 30 MIN: CPT

## 2021-12-08 RX ORDER — CLOPIDOGREL BISULFATE 75 MG/1
75 TABLET, FILM COATED ORAL DAILY
Qty: 90 | Refills: 3 | Status: DISCONTINUED | COMMUNITY
Start: 2020-12-24 | End: 2021-12-08

## 2021-12-09 NOTE — HISTORY OF PRESENT ILLNESS
[FreeTextEntry1] : 75 M COVID stent to Ramus 11/2020 in the settting of stable chest pain was on losartan in the past was stopped due to hypotension still sob in the morning and it gets better as the day progresses as he warms up gets better he feels better \par \par \par \par ecg nsr

## 2021-12-09 NOTE — ASSESSMENT
[FreeTextEntry1] : sob stress echo it may be microvascular disease he did not tolerate ARB in the past will start very low dose \par has good preripheral pulses on GDMT\par CAD stop plavix\par elevated TG on omega \par fu in six months

## 2022-01-03 ENCOUNTER — RX RENEWAL (OUTPATIENT)
Age: 77
End: 2022-01-03

## 2022-01-06 ENCOUNTER — RX RENEWAL (OUTPATIENT)
Age: 77
End: 2022-01-06

## 2022-01-13 ENCOUNTER — FORM ENCOUNTER (OUTPATIENT)
Age: 77
End: 2022-01-13

## 2022-01-14 ENCOUNTER — OUTPATIENT (OUTPATIENT)
Dept: OUTPATIENT SERVICES | Facility: HOSPITAL | Age: 77
LOS: 1 days | End: 2022-01-14
Payer: MEDICARE

## 2022-01-14 DIAGNOSIS — R06.02 SHORTNESS OF BREATH: ICD-10-CM

## 2022-01-14 DIAGNOSIS — Z90.49 ACQUIRED ABSENCE OF OTHER SPECIFIED PARTS OF DIGESTIVE TRACT: Chronic | ICD-10-CM

## 2022-01-14 DIAGNOSIS — Z98.49 CATARACT EXTRACTION STATUS, UNSPECIFIED EYE: Chronic | ICD-10-CM

## 2022-01-14 DIAGNOSIS — L72.9 FOLLICULAR CYST OF THE SKIN AND SUBCUTANEOUS TISSUE, UNSPECIFIED: Chronic | ICD-10-CM

## 2022-01-14 PROCEDURE — 93351 STRESS TTE COMPLETE: CPT

## 2022-01-14 PROCEDURE — 93351 STRESS TTE COMPLETE: CPT | Mod: 26,52

## 2022-02-28 NOTE — ED ADULT NURSE NOTE - PAIN: BODY LOCATION
chest, anterior wall/Left:/chest, substernal Metronidazole Pregnancy And Lactation Text: This medication is Pregnancy Category B and considered safe during pregnancy.  It is also excreted in breast milk.

## 2022-03-07 ENCOUNTER — RX RENEWAL (OUTPATIENT)
Age: 77
End: 2022-03-07

## 2022-06-08 ENCOUNTER — RX RENEWAL (OUTPATIENT)
Age: 77
End: 2022-06-08

## 2022-08-03 ENCOUNTER — EMERGENCY (EMERGENCY)
Facility: HOSPITAL | Age: 77
LOS: 1 days | Discharge: HOME | End: 2022-08-03
Attending: EMERGENCY MEDICINE | Admitting: EMERGENCY MEDICINE
Payer: MEDICARE

## 2022-08-03 VITALS
OXYGEN SATURATION: 99 % | SYSTOLIC BLOOD PRESSURE: 132 MMHG | HEART RATE: 65 BPM | DIASTOLIC BLOOD PRESSURE: 85 MMHG | TEMPERATURE: 98 F | HEIGHT: 60 IN | RESPIRATION RATE: 18 BRPM | WEIGHT: 134.92 LBS

## 2022-08-03 VITALS
HEART RATE: 68 BPM | SYSTOLIC BLOOD PRESSURE: 168 MMHG | RESPIRATION RATE: 17 BRPM | OXYGEN SATURATION: 99 % | DIASTOLIC BLOOD PRESSURE: 88 MMHG | TEMPERATURE: 98 F

## 2022-08-03 DIAGNOSIS — Z79.82 LONG TERM (CURRENT) USE OF ASPIRIN: ICD-10-CM

## 2022-08-03 DIAGNOSIS — E78.5 HYPERLIPIDEMIA, UNSPECIFIED: ICD-10-CM

## 2022-08-03 DIAGNOSIS — M54.6 PAIN IN THORACIC SPINE: ICD-10-CM

## 2022-08-03 DIAGNOSIS — Z95.5 PRESENCE OF CORONARY ANGIOPLASTY IMPLANT AND GRAFT: ICD-10-CM

## 2022-08-03 DIAGNOSIS — Z82.49 FAMILY HISTORY OF ISCHEMIC HEART DISEASE AND OTHER DISEASES OF THE CIRCULATORY SYSTEM: ICD-10-CM

## 2022-08-03 DIAGNOSIS — I10 ESSENTIAL (PRIMARY) HYPERTENSION: ICD-10-CM

## 2022-08-03 DIAGNOSIS — L72.9 FOLLICULAR CYST OF THE SKIN AND SUBCUTANEOUS TISSUE, UNSPECIFIED: Chronic | ICD-10-CM

## 2022-08-03 DIAGNOSIS — Z90.49 ACQUIRED ABSENCE OF OTHER SPECIFIED PARTS OF DIGESTIVE TRACT: ICD-10-CM

## 2022-08-03 DIAGNOSIS — Z20.822 CONTACT WITH AND (SUSPECTED) EXPOSURE TO COVID-19: ICD-10-CM

## 2022-08-03 DIAGNOSIS — R07.89 OTHER CHEST PAIN: ICD-10-CM

## 2022-08-03 DIAGNOSIS — I25.10 ATHEROSCLEROTIC HEART DISEASE OF NATIVE CORONARY ARTERY WITHOUT ANGINA PECTORIS: ICD-10-CM

## 2022-08-03 DIAGNOSIS — Z98.49 CATARACT EXTRACTION STATUS, UNSPECIFIED EYE: Chronic | ICD-10-CM

## 2022-08-03 DIAGNOSIS — Z90.49 ACQUIRED ABSENCE OF OTHER SPECIFIED PARTS OF DIGESTIVE TRACT: Chronic | ICD-10-CM

## 2022-08-03 DIAGNOSIS — Z86.018 PERSONAL HISTORY OF OTHER BENIGN NEOPLASM: ICD-10-CM

## 2022-08-03 DIAGNOSIS — R06.02 SHORTNESS OF BREATH: ICD-10-CM

## 2022-08-03 LAB
ALBUMIN SERPL ELPH-MCNC: 3.9 G/DL — SIGNIFICANT CHANGE UP (ref 3.3–5)
ALBUMIN SERPL ELPH-MCNC: 4 G/DL — SIGNIFICANT CHANGE UP (ref 3.3–5)
ALBUMIN SERPL ELPH-MCNC: 4.1 G/DL — SIGNIFICANT CHANGE UP (ref 3.3–5)
ALP SERPL-CCNC: 56 U/L — SIGNIFICANT CHANGE UP (ref 40–120)
ALP SERPL-CCNC: 60 U/L — SIGNIFICANT CHANGE UP (ref 40–120)
ALP SERPL-CCNC: 60 U/L — SIGNIFICANT CHANGE UP (ref 40–120)
ALT FLD-CCNC: 25 U/L — SIGNIFICANT CHANGE UP (ref 10–45)
ALT FLD-CCNC: 25 U/L — SIGNIFICANT CHANGE UP (ref 10–45)
ALT FLD-CCNC: SIGNIFICANT CHANGE UP U/L (ref 10–45)
ANION GAP SERPL CALC-SCNC: 8 MMOL/L — SIGNIFICANT CHANGE UP (ref 5–17)
ANION GAP SERPL CALC-SCNC: 8 MMOL/L — SIGNIFICANT CHANGE UP (ref 5–17)
ANION GAP SERPL CALC-SCNC: 9 MMOL/L — SIGNIFICANT CHANGE UP (ref 5–17)
APTT BLD: 30 SEC — SIGNIFICANT CHANGE UP (ref 27.5–35.5)
AST SERPL-CCNC: 25 U/L — SIGNIFICANT CHANGE UP (ref 10–40)
AST SERPL-CCNC: 26 U/L — SIGNIFICANT CHANGE UP (ref 10–40)
AST SERPL-CCNC: SIGNIFICANT CHANGE UP U/L (ref 10–40)
BASE EXCESS BLDV CALC-SCNC: 0.4 MMOL/L — SIGNIFICANT CHANGE UP (ref -2–3)
BASOPHILS # BLD AUTO: 0.04 K/UL — SIGNIFICANT CHANGE UP (ref 0–0.2)
BASOPHILS NFR BLD AUTO: 0.7 % — SIGNIFICANT CHANGE UP (ref 0–2)
BILIRUB SERPL-MCNC: 0.7 MG/DL — SIGNIFICANT CHANGE UP (ref 0.2–1.2)
BILIRUB SERPL-MCNC: 0.7 MG/DL — SIGNIFICANT CHANGE UP (ref 0.2–1.2)
BILIRUB SERPL-MCNC: 0.8 MG/DL — SIGNIFICANT CHANGE UP (ref 0.2–1.2)
BUN SERPL-MCNC: 18 MG/DL — SIGNIFICANT CHANGE UP (ref 7–23)
BUN SERPL-MCNC: 19 MG/DL — SIGNIFICANT CHANGE UP (ref 7–23)
BUN SERPL-MCNC: 21 MG/DL — SIGNIFICANT CHANGE UP (ref 7–23)
CA-I SERPL-SCNC: 1.23 MMOL/L — SIGNIFICANT CHANGE UP (ref 1.15–1.33)
CALCIUM SERPL-MCNC: 8.4 MG/DL — SIGNIFICANT CHANGE UP (ref 8.4–10.5)
CALCIUM SERPL-MCNC: 8.6 MG/DL — SIGNIFICANT CHANGE UP (ref 8.4–10.5)
CALCIUM SERPL-MCNC: 9.1 MG/DL — SIGNIFICANT CHANGE UP (ref 8.4–10.5)
CHLORIDE SERPL-SCNC: 105 MMOL/L — SIGNIFICANT CHANGE UP (ref 96–108)
CHLORIDE SERPL-SCNC: 108 MMOL/L — SIGNIFICANT CHANGE UP (ref 96–108)
CHLORIDE SERPL-SCNC: 108 MMOL/L — SIGNIFICANT CHANGE UP (ref 96–108)
CK MB CFR SERPL CALC: 1.3 NG/ML — SIGNIFICANT CHANGE UP (ref 0–6.7)
CK SERPL-CCNC: 101 U/L — SIGNIFICANT CHANGE UP (ref 30–200)
CO2 BLDV-SCNC: 29.4 MMOL/L — HIGH (ref 22–26)
CO2 SERPL-SCNC: 23 MMOL/L — SIGNIFICANT CHANGE UP (ref 22–31)
CO2 SERPL-SCNC: 24 MMOL/L — SIGNIFICANT CHANGE UP (ref 22–31)
CO2 SERPL-SCNC: 25 MMOL/L — SIGNIFICANT CHANGE UP (ref 22–31)
CREAT SERPL-MCNC: 0.73 MG/DL — SIGNIFICANT CHANGE UP (ref 0.5–1.3)
CREAT SERPL-MCNC: 0.76 MG/DL — SIGNIFICANT CHANGE UP (ref 0.5–1.3)
CREAT SERPL-MCNC: 0.84 MG/DL — SIGNIFICANT CHANGE UP (ref 0.5–1.3)
EGFR: 92 ML/MIN/1.73M2 — SIGNIFICANT CHANGE UP
EGFR: 94 ML/MIN/1.73M2 — SIGNIFICANT CHANGE UP
EGFR: 95 ML/MIN/1.73M2 — SIGNIFICANT CHANGE UP
EOSINOPHIL # BLD AUTO: 0.1 K/UL — SIGNIFICANT CHANGE UP (ref 0–0.5)
EOSINOPHIL NFR BLD AUTO: 1.8 % — SIGNIFICANT CHANGE UP (ref 0–6)
GAS PNL BLDV: 136 MMOL/L — SIGNIFICANT CHANGE UP (ref 136–145)
GAS PNL BLDV: SIGNIFICANT CHANGE UP
GLUCOSE SERPL-MCNC: 100 MG/DL — HIGH (ref 70–99)
GLUCOSE SERPL-MCNC: 105 MG/DL — HIGH (ref 70–99)
GLUCOSE SERPL-MCNC: 99 MG/DL — SIGNIFICANT CHANGE UP (ref 70–99)
HCO3 BLDV-SCNC: 28 MMOL/L — SIGNIFICANT CHANGE UP (ref 22–29)
HCT VFR BLD CALC: 42.2 % — SIGNIFICANT CHANGE UP (ref 39–50)
HGB BLD-MCNC: 14.5 G/DL — SIGNIFICANT CHANGE UP (ref 13–17)
IMM GRANULOCYTES NFR BLD AUTO: 0.4 % — SIGNIFICANT CHANGE UP (ref 0–1.5)
INR BLD: 1.11 — SIGNIFICANT CHANGE UP (ref 0.88–1.16)
LYMPHOCYTES # BLD AUTO: 1.61 K/UL — SIGNIFICANT CHANGE UP (ref 1–3.3)
LYMPHOCYTES # BLD AUTO: 29.1 % — SIGNIFICANT CHANGE UP (ref 13–44)
MAGNESIUM SERPL-MCNC: 2.2 MG/DL — SIGNIFICANT CHANGE UP (ref 1.6–2.6)
MCHC RBC-ENTMCNC: 30.3 PG — SIGNIFICANT CHANGE UP (ref 27–34)
MCHC RBC-ENTMCNC: 34.4 GM/DL — SIGNIFICANT CHANGE UP (ref 32–36)
MCV RBC AUTO: 88.3 FL — SIGNIFICANT CHANGE UP (ref 80–100)
MONOCYTES # BLD AUTO: 0.54 K/UL — SIGNIFICANT CHANGE UP (ref 0–0.9)
MONOCYTES NFR BLD AUTO: 9.7 % — SIGNIFICANT CHANGE UP (ref 2–14)
NEUTROPHILS # BLD AUTO: 3.23 K/UL — SIGNIFICANT CHANGE UP (ref 1.8–7.4)
NEUTROPHILS NFR BLD AUTO: 58.3 % — SIGNIFICANT CHANGE UP (ref 43–77)
NRBC # BLD: 0 /100 WBCS — SIGNIFICANT CHANGE UP (ref 0–0)
NT-PROBNP SERPL-SCNC: 40 PG/ML — SIGNIFICANT CHANGE UP (ref 0–300)
PCO2 BLDV: 55 MMHG — SIGNIFICANT CHANGE UP (ref 42–55)
PH BLDV: 7.31 — LOW (ref 7.32–7.43)
PLATELET # BLD AUTO: 154 K/UL — SIGNIFICANT CHANGE UP (ref 150–400)
PO2 BLDV: <33 MMHG — LOW (ref 25–45)
POTASSIUM BLDV-SCNC: 4.5 MMOL/L — SIGNIFICANT CHANGE UP (ref 3.5–5.1)
POTASSIUM SERPL-MCNC: 4.4 MMOL/L — SIGNIFICANT CHANGE UP (ref 3.5–5.3)
POTASSIUM SERPL-MCNC: 5.1 MMOL/L — SIGNIFICANT CHANGE UP (ref 3.5–5.3)
POTASSIUM SERPL-MCNC: SIGNIFICANT CHANGE UP MMOL/L (ref 3.5–5.3)
POTASSIUM SERPL-SCNC: 4.4 MMOL/L — SIGNIFICANT CHANGE UP (ref 3.5–5.3)
POTASSIUM SERPL-SCNC: 5.1 MMOL/L — SIGNIFICANT CHANGE UP (ref 3.5–5.3)
POTASSIUM SERPL-SCNC: SIGNIFICANT CHANGE UP MMOL/L (ref 3.5–5.3)
PROT SERPL-MCNC: 6.4 G/DL — SIGNIFICANT CHANGE UP (ref 6–8.3)
PROT SERPL-MCNC: 6.4 G/DL — SIGNIFICANT CHANGE UP (ref 6–8.3)
PROT SERPL-MCNC: 6.5 G/DL — SIGNIFICANT CHANGE UP (ref 6–8.3)
PROTHROM AB SERPL-ACNC: 13.2 SEC — SIGNIFICANT CHANGE UP (ref 10.5–13.4)
RBC # BLD: 4.78 M/UL — SIGNIFICANT CHANGE UP (ref 4.2–5.8)
RBC # FLD: 13 % — SIGNIFICANT CHANGE UP (ref 10.3–14.5)
SAO2 % BLDV: 24.5 % — LOW (ref 67–88)
SARS-COV-2 RNA SPEC QL NAA+PROBE: NEGATIVE — SIGNIFICANT CHANGE UP
SODIUM SERPL-SCNC: 139 MMOL/L — SIGNIFICANT CHANGE UP (ref 135–145)
SODIUM SERPL-SCNC: 139 MMOL/L — SIGNIFICANT CHANGE UP (ref 135–145)
SODIUM SERPL-SCNC: 140 MMOL/L — SIGNIFICANT CHANGE UP (ref 135–145)
TROPONIN T SERPL-MCNC: 0.01 NG/ML — SIGNIFICANT CHANGE UP (ref 0–0.01)
TROPONIN T SERPL-MCNC: 0.01 NG/ML — SIGNIFICANT CHANGE UP (ref 0–0.01)
WBC # BLD: 5.54 K/UL — SIGNIFICANT CHANGE UP (ref 3.8–10.5)
WBC # FLD AUTO: 5.54 K/UL — SIGNIFICANT CHANGE UP (ref 3.8–10.5)

## 2022-08-03 PROCEDURE — 99285 EMERGENCY DEPT VISIT HI MDM: CPT | Mod: 25

## 2022-08-03 PROCEDURE — 84132 ASSAY OF SERUM POTASSIUM: CPT

## 2022-08-03 PROCEDURE — 83880 ASSAY OF NATRIURETIC PEPTIDE: CPT

## 2022-08-03 PROCEDURE — 93005 ELECTROCARDIOGRAM TRACING: CPT

## 2022-08-03 PROCEDURE — 84484 ASSAY OF TROPONIN QUANT: CPT

## 2022-08-03 PROCEDURE — G1004: CPT

## 2022-08-03 PROCEDURE — 80053 COMPREHEN METABOLIC PANEL: CPT

## 2022-08-03 PROCEDURE — 82330 ASSAY OF CALCIUM: CPT

## 2022-08-03 PROCEDURE — 36415 COLL VENOUS BLD VENIPUNCTURE: CPT

## 2022-08-03 PROCEDURE — 71275 CT ANGIOGRAPHY CHEST: CPT | Mod: 26,MG

## 2022-08-03 PROCEDURE — 71046 X-RAY EXAM CHEST 2 VIEWS: CPT | Mod: 26

## 2022-08-03 PROCEDURE — 74174 CTA ABD&PLVS W/CONTRAST: CPT | Mod: 26,MG

## 2022-08-03 PROCEDURE — 82550 ASSAY OF CK (CPK): CPT

## 2022-08-03 PROCEDURE — 71046 X-RAY EXAM CHEST 2 VIEWS: CPT

## 2022-08-03 PROCEDURE — 83690 ASSAY OF LIPASE: CPT

## 2022-08-03 PROCEDURE — 82803 BLOOD GASES ANY COMBINATION: CPT

## 2022-08-03 PROCEDURE — 82553 CREATINE MB FRACTION: CPT

## 2022-08-03 PROCEDURE — 74174 CTA ABD&PLVS W/CONTRAST: CPT | Mod: MG

## 2022-08-03 PROCEDURE — 87635 SARS-COV-2 COVID-19 AMP PRB: CPT

## 2022-08-03 PROCEDURE — 85610 PROTHROMBIN TIME: CPT

## 2022-08-03 PROCEDURE — 84295 ASSAY OF SERUM SODIUM: CPT

## 2022-08-03 PROCEDURE — 71275 CT ANGIOGRAPHY CHEST: CPT | Mod: MG

## 2022-08-03 PROCEDURE — 93010 ELECTROCARDIOGRAM REPORT: CPT

## 2022-08-03 PROCEDURE — 85730 THROMBOPLASTIN TIME PARTIAL: CPT

## 2022-08-03 PROCEDURE — 85025 COMPLETE CBC W/AUTO DIFF WBC: CPT

## 2022-08-03 PROCEDURE — 83735 ASSAY OF MAGNESIUM: CPT

## 2022-08-03 NOTE — ED ADULT NURSE NOTE - OBJECTIVE STATEMENT
.  76years male alert mental state (AOX3) received by EMS.  -Allergy: N/A.  -complain of chest pain.  Hx of CAD. pt started on/off upper back pain and combined with chest pain, radiating Lt arm and SOB today morning. pt took 324mg aspirin at home.   pt states that his chest pain is relieved after he took aspirins (scale:4 from 8).   denied dizziness, headache, n/v/d, abdomen pain.  Pt is in the bed comfortably at this time. Will continue to monitor and document any changes.

## 2022-08-03 NOTE — ED ADULT TRIAGE NOTE - CHIEF COMPLAINT QUOTE
Pt BIBA c/o midsternal chest pain x one week. Hx of 2x cardiac stents in 11/2020. Pt given 324 ASA and 18G to LAC by EMS. EKG initiated.

## 2022-08-03 NOTE — ED ADULT NURSE NOTE - NSIMPLEMENTINTERV_GEN_ALL_ED
Implemented All Fall Risk Interventions:  [place] to call system. Call bell, personal items and telephone within reach. Instruct patient to call for assistance. Room bathroom lighting operational. Non-slip footwear when patient is off stretcher. Physically safe environment: no spills, clutter or unnecessary equipment. Stretcher in lowest position, wheels locked, appropriate side rails in place. Provide visual cue, wrist band, yellow gown, etc. Monitor gait and stability. Monitor for mental status changes and reorient to person, place, and time. Review medications for side effects contributing to fall risk. Reinforce activity limits and safety measures with patient and family.

## 2022-08-03 NOTE — ED PROVIDER NOTE - CLINICAL SUMMARY MEDICAL DECISION MAKING FREE TEXT BOX
75 y/o male w/ hx htn, hld, cad with 2 stents p/w intermittent upper back pain with sob x 1 wk, followed by chest pain x 1 day  EKG without acute ischemic changes  Plan for labs including troponin, lipase.  CXR.  Will obtain CT chest/ap to r/o dissection.  Will discuss with pt's cardiologist to discuss further dispo  --  CT chest/ ap without dissection  Labs thusfar unrevealing  Pending 2nd troponin, lipase, covid swab  Discussed with pt's cardiologist, who reports pt with long hx of similar.  States if 2 troponins negative can be dc'd to f/u with her in office. 77 y/o male w/ hx htn, hld, cad with 2 stents p/w intermittent upper back pain with sob x 1 wk, followed by chest pain x 1 day  EKG without acute ischemic changes  Plan for labs including troponin, lipase.  CXR.  Will obtain CT chest/ap to r/o dissection.  Will discuss with pt's cardiologist to discuss further dispo  --  CT chest/ ap without dissection  Labs thusfar unrevealing  Pending 2nd troponin, lipase, covid swab  Discussed with pt's cardiologist, who reports pt with long hx of similar.  States if 2 troponins negative can be dc'd to f/u with her in office.  --  No chest pain on re-eval  lipase 96, rpt trop neg  will DC with strict return precautions

## 2022-08-03 NOTE — ED PROVIDER NOTE - NS ED ATTENDING STATEMENT MOD
This was a shared visit with the LEI. I reviewed and verified the documentation and independently performed the documented:

## 2022-08-03 NOTE — ED PROVIDER NOTE - NSFOLLOWUPINSTRUCTIONS_ED_ALL_ED_FT
Thank you for visiting [facility] Emergency Department.      We saw you today for chest pain.    Please call your cardiologist tomorrow.  You will need close follow up.     Please know that no emergency visit is complete without follow-up with your primary care provider in 1 week.  Please bring copies of all discharge papers and results and show to your doctor.      Please continue taking all previous medications as instructed unless we discussed otherwise.     I appreciated your patience and hope you feel better soon.     Return to ER immediately if you develop fevers, chills, new or worsening chest pain, shortness of breath, worsening and/or any concerning symptoms.

## 2022-08-03 NOTE — ED PROVIDER NOTE - NS ED ROS FT
CONSTITUTIONAL: Denies fever and chills    HEENT: Denies changes in vision and hearing.    RESPIRATORY: +SOB    CARDIOVASCULAR: +CP.  Denies palpitations    GASTROINTESTINAL: Denies abdominal pain, nausea, vomiting and diarrhea.    GENITOURINARY: Denies dysuria and hematuria.    MUSCULOSKELETAL: Denies myalgia and joint pain.    SKIN: Denies rash and pruritus.    ?NEUROLOGICAL: Denies headache and syncope.    PSYCHIATRIC: Denies recent changes in mood. Denies anxiety and depression.

## 2022-08-03 NOTE — ED PROVIDER NOTE - OBJECTIVE STATEMENT
75 y/o male w/ hx htn, hld, cad with 2 stents p/w intermittent upper mid back pain and sob 75 y/o male w/ hx htn, hld, cad with 2 stents p/w intermittent upper mid back pain and sob x 1 wk.  States today c/o lower chest pressure, had severe episode lasting around 5 min around 11am, has since improved.  Reports hx of similar in past, states felt similarly when he needed stent.    Denies modifying factors to pain, not pleuritic or reproducible.  Denies f/c, cough, palpitations, abd pain, n/v/d, leg swelling.  Denies recent travel or trauma, denies smoking, hx DVT/PE.  Drinks 1 glass wine/ day.

## 2022-08-03 NOTE — ED PROVIDER NOTE - NSICDXPASTSURGICALHX_GEN_ALL_CORE_FT
PAST SURGICAL HISTORY:  History of appendectomy     History of cataract surgery B/L eyes    Skin cyst Removal on right chest

## 2022-08-03 NOTE — ED PROVIDER NOTE - ATTENDING APP SHARED VISIT CONTRIBUTION OF CARE
75 yo male h/o htn, hld, cad s/p 2 stents c/o intermittent upper mid back pain and sob x 1 wk w onset of cp radiating from back today.  Pt w h/o similar in the past 2/2 cad.  No abd pain, n/v/d.  No uri sx, fever.  No travel. Well appearing, nad, nc/at, lung cta, heart reg, abd soft, nt, ext no gross deformity, no gross neuro deficits Pt c/o cp, back pain - ? acs, ? dissection, no cough/fever to suggest pna, ? pe (no risks), ? msk.  Plan labs, cta; reassess.

## 2022-08-03 NOTE — ED PROVIDER NOTE - PATIENT PORTAL LINK FT
You can access the FollowMyHealth Patient Portal offered by [facility] by registering at the following website: [email or web address]. By joining [employer]’s FollowMyHealth portal, you will also be able to view your health information using other applications (apps) compatible with our system.

## 2022-08-03 NOTE — ED PROVIDER NOTE - PHYSICAL EXAMINATION
CONSTITUTIONAL: Awake, alert.  Nontoxic, no acute distress.    HEAD: Normocephalic, atraumatic.    EYES: Conjunctivae clear without exudates or hemorrhage. Sclera is non-icteric.    ENT: Normal appearing external ears, nose, mucous membranes moist.    NECK: supple, trachea midline.    HEART:  Normal rate, regular rhythm.  Heart sounds S1, S2.  No murmurs, rubs or gallops.    LUNGS:  No acute respiratory distress.  Non-tachypneic and non-labored.  Lungs are clear bilaterally with good aeration.  No wheezing, rales, rhonchi.    ABDOMEN: Normal appearing skin without lesions, rashes.  Normal bowel sounds x 4.  Soft, non-distended, non-tender in all four quadrants. No rebound or guarding. No hernias or masses palpable.  No pulsatile abdominal mass.   No CVA tenderness b/l.    MUSCULOSKELETAL:  Moving all extremities without issue.  No edema    SKIN: Skin in warm, dry and intact without rashes or lesions.  Appropriate color for ethnicity.    NEUROLOGICAL:  Patient is alert, oriented x person, place and time.    PSYCH: Appropriate mood and affect. Good judgment and insight.

## 2022-08-05 ENCOUNTER — NON-APPOINTMENT (OUTPATIENT)
Age: 77
End: 2022-08-05

## 2022-08-05 ENCOUNTER — APPOINTMENT (OUTPATIENT)
Dept: HEART AND VASCULAR | Facility: CLINIC | Age: 77
End: 2022-08-05

## 2022-08-05 VITALS
HEART RATE: 72 BPM | BODY MASS INDEX: 20.29 KG/M2 | DIASTOLIC BLOOD PRESSURE: 74 MMHG | OXYGEN SATURATION: 97 % | HEIGHT: 69 IN | SYSTOLIC BLOOD PRESSURE: 126 MMHG | TEMPERATURE: 95.5 F | WEIGHT: 137 LBS

## 2022-08-05 PROCEDURE — 93000 ELECTROCARDIOGRAM COMPLETE: CPT

## 2022-08-05 PROCEDURE — 99214 OFFICE O/P EST MOD 30 MIN: CPT

## 2022-08-05 PROCEDURE — 36415 COLL VENOUS BLD VENIPUNCTURE: CPT

## 2022-08-05 RX ORDER — PANTOPRAZOLE 40 MG/1
40 TABLET, DELAYED RELEASE ORAL
Qty: 14 | Refills: 0 | Status: DISCONTINUED | COMMUNITY
Start: 2021-01-05 | End: 2022-08-05

## 2022-08-05 RX ORDER — AZITHROMYCIN 250 MG/1
250 TABLET, FILM COATED ORAL
Qty: 6 | Refills: 0 | Status: DISCONTINUED | COMMUNITY
Start: 2022-06-10 | End: 2022-08-05

## 2022-08-05 RX ORDER — METOPROLOL SUCCINATE 25 MG/1
25 TABLET, EXTENDED RELEASE ORAL
Qty: 45 | Refills: 3 | Status: DISCONTINUED | COMMUNITY
Start: 2020-11-11 | End: 2022-08-05

## 2022-08-05 NOTE — HISTORY OF PRESENT ILLNESS
[FreeTextEntry1] : 76 M COVID stent to Ramus 11/2020 in the settting of stable chest pain was on losartan in the past was stopped due to hypotension \par \par was in ED for back pain constant pain scapular pain all week on tuesday had chest pain when he pressed on his stomach radiated to his back this occurred after his breakfast with sob in general when he is active he feels well he lifted heavy items and felt fine \par \par ecg nsr 8/5/2022\par stress echo normal METS 8.2 1/2022

## 2022-08-05 NOTE — ASSESSMENT
[FreeTextEntry1] : symptoms are atypical \par cad on losartan 1/2 tablet daily asa omega 3 and atorvastatin\par fu in six months

## 2022-08-07 LAB
ALBUMIN SERPL ELPH-MCNC: 4.5 G/DL
ALP BLD-CCNC: 64 U/L
ALT SERPL-CCNC: 26 U/L
ANION GAP SERPL CALC-SCNC: 12 MMOL/L
AST SERPL-CCNC: 25 U/L
BILIRUB SERPL-MCNC: 0.8 MG/DL
BUN SERPL-MCNC: 19 MG/DL
CALCIUM SERPL-MCNC: 9.5 MG/DL
CHLORIDE SERPL-SCNC: 105 MMOL/L
CHOLEST SERPL-MCNC: 137 MG/DL
CO2 SERPL-SCNC: 24 MMOL/L
CREAT SERPL-MCNC: 0.93 MG/DL
CREAT SPEC-SCNC: 124 MG/DL
EGFR: 86 ML/MIN/1.73M2
ESTIMATED AVERAGE GLUCOSE: 111 MG/DL
GLUCOSE SERPL-MCNC: 91 MG/DL
HBA1C MFR BLD HPLC: 5.5 %
HDLC SERPL-MCNC: 70 MG/DL
LDLC SERPL CALC-MCNC: 38 MG/DL
LPL SERPL-CCNC: 124 U/L
MICROALBUMIN 24H UR DL<=1MG/L-MCNC: <1.2 MG/DL
MICROALBUMIN/CREAT 24H UR-RTO: NORMAL MG/G
NONHDLC SERPL-MCNC: 67 MG/DL
POTASSIUM SERPL-SCNC: 4.5 MMOL/L
PROT SERPL-MCNC: 6.7 G/DL
SODIUM SERPL-SCNC: 142 MMOL/L
TRIGL SERPL-MCNC: 144 MG/DL

## 2022-09-01 ENCOUNTER — RX RENEWAL (OUTPATIENT)
Age: 77
End: 2022-09-01

## 2022-11-09 NOTE — PATIENT PROFILE ADULT - MEDICATIONS/VISITS
no Dutasteride Counseling: Dustasteride Counseling:  I discussed with the patient the risks of use of dutasteride including but not limited to decreased libido, decreased ejaculate volume, and gynecomastia. Women who can become pregnant should not handle medication.  All of the patient's questions and concerns were addressed. Dutasteride Male Counseling: Dustasteride Counseling:  I discussed with the patient the risks of use of dutasteride including but not limited to decreased libido, decreased ejaculate volume, and gynecomastia. Women who can become pregnant should not handle medication.  All of the patient's questions and concerns were addressed.

## 2022-11-15 ENCOUNTER — APPOINTMENT (OUTPATIENT)
Dept: ORTHOPEDIC SURGERY | Facility: CLINIC | Age: 77
End: 2022-11-15

## 2022-11-15 PROCEDURE — 99203 OFFICE O/P NEW LOW 30 MIN: CPT

## 2022-11-15 NOTE — HISTORY OF PRESENT ILLNESS
[8] : 8 [] : yes [de-identified] : 11/15/22: 77yo RHD NILESH ([employer] [place]) presents for RIGHT >> LEFT hand pain x 1 year. Pain localized to MPJ and IPJ of index, middle, ring, small fingers. Pain worst to ring finger. Warm water helps a little, especially in the morning. Denies numbness/tingling. Denies injury.\par Saw PCP => ordered XR of bilateral hand/wrist/finger at [facility] 9/7/22. Reports indicates "no acute fracture or dislocation. joint spaces of the hands and wrists are maintained, bilaterally, without appreciable degenerative change. no cortical erosions or periostitis to indicate inflammatory arthritis The right and left fourth digits are unremarkable. There are no destructive osseous lesions. No radiographic soft tissue abnormalities."\par Not currently taking any medication for this.\par \par Hx: CAD s/p stent. HTN. high cholesterol. [FreeTextEntry5] : NP 76 year old m presenting with right hand pain for the past 1 year, pt states he previously had an MRI done at [facility], sent by his PCP. Pt states the pain has increased for the past few weeks, stiffness is constant. worse with cold weather is unsure if its arthritis or not, was instructed to see specialist for further evaluation.  [de-identified] : MRI 09/07/2022 [facility]

## 2022-11-15 NOTE — IMAGING
[de-identified] : LEFT HAND\par skin intact. no swelling.\par TTP to PIPJ of IF/MF.\par good wrist extension, flexion. good pronation, supination.\par good EPL, FPL. good finger extension, flex to full fist. good finger abduction and adduction. \par SILT to median, ulnar, radial distribution. \par palpable radial pulse, brisk cap refill all digits.\par no triggering.\par negative Tinel's at carpal tunnel.\par \par \par RIGHT HAND\par skin intact. no swelling.\par TTP to MPJ and PIPJ of index, middle, ring, small fingers. TTP to radial styloid.\par good wrist extension, flexion. good pronation, supination.\par good EPL, FPL. good finger extension, flex to full fist. good finger abduction and adduction. \par SILT to median, ulnar, radial distribution. \par palpable radial pulse, brisk cap refill all digits.\par no triggering.\par negative Tinel's at carpal tunnel.\par + pain with .

## 2022-11-15 NOTE — ASSESSMENT
[FreeTextEntry1] : No images of XR bilateral wrist/hand/fingers to review today. Report reviewed. \par Patient declined repeat XR today, will obtain CD of images. Discussed that evaluation is incomplete without imaging. Patient expressed understanding.\par \par The condition was explained to the patient. 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 Discussed treatment options - activity modification, bracing, steroid injection, or last resort surgery.\par - recommend compression glove PRN.\par - recommend activity modification, moist heat PRN.\par - recommend OTC pain medications such as Tylenol and NSAIDs as needed, provided there are no contra-indicated medical conditions (eg liver disease, kidney disease, or GI ulcer/bleeding) or medications (eg blood thinners). Discussed possible GI and blood pressure side effects. will get clearance from Cardiology prior to starting NSAIDs.\par \par F/u PRN.\par

## 2022-11-22 ENCOUNTER — APPOINTMENT (OUTPATIENT)
Dept: ORTHOPEDIC SURGERY | Facility: CLINIC | Age: 77
End: 2022-11-22

## 2022-11-22 DIAGNOSIS — M19.049 PRIMARY OSTEOARTHRITIS, UNSPECIFIED HAND: ICD-10-CM

## 2022-11-22 PROCEDURE — 99214 OFFICE O/P EST MOD 30 MIN: CPT | Mod: 25

## 2022-11-22 PROCEDURE — 20600 DRAIN/INJ JOINT/BURSA W/O US: CPT

## 2022-11-22 NOTE — ASSESSMENT
[FreeTextEntry1] : XR reviewed with patient.\par \par Patient would like to proceed with CSI.\par - Discussed risks, benefits, and alternatives as well as contents of injection. Risks include, but are not limited allergic reaction, flare reaction, injection site pain, bruising, numbness, increased blood sugar, skin discoloration, fat atrophy, tendon rupture, and infection. Risk of immune suppression and increased susceptibility to infection with steroid use. Patient expressed understanding and would like to proceed with injection.\par - The skin over the RIGHT ring finger PIPJ was cleansed with alcohol/betadine and anesthetized with ethyl chloride and 1cc of 1% lidocaine. The joint was injected with 6mg of celestone. Site was dressed with a band-aid. Patient tolerated the procedure well.\par - OTC pain medication, activity modification, ice PRN.\par \par F/u PRN.

## 2022-11-22 NOTE — IMAGING
[de-identified] : LEFT HAND\par skin intact. no swelling.\par TTP to PIPJ of IF/MF.\par good wrist extension, flexion. good pronation, supination.\par good EPL, FPL. good finger extension, flex to full fist. good finger abduction and adduction. \par SILT to median, ulnar, radial distribution. \par palpable radial pulse, brisk cap refill all digits.\par no triggering.\par negative Tinel's at carpal tunnel.\par \par \par RIGHT HAND\par skin intact. no swelling.\par TTP to PIPJ of MF/RF. TTP to MPJ and PIPJ of index, middle, ring, small fingers. TTP to radial styloid.\par good wrist extension, flexion. good pronation, supination.\par good EPL, FPL. good finger extension, flex to full fist. good finger abduction and adduction. \par SILT to median, ulnar, radial distribution. \par palpable radial pulse, brisk cap refill all digits.\par no triggering.\par negative Tinel's at carpal tunnel.\par + pain with .\par \par \par XR BILATERAL RING FINGER: no acute displaced fracture or dislocation. \par XR BILATERAL HAND: no acute displaced fracture or dislocation.\par XR BILATERAL WRIST: no acute displaced fracture or dislocation.

## 2022-11-22 NOTE — HISTORY OF PRESENT ILLNESS
[] : yes [de-identified] : 11/22/22: f/u BILATERAL hand pain. reports pain is worst at RIGHT ring finger PIPJ, worse with finger extension. brought CD with XR bilateral hand/wrist for review.\par \par 11/15/22: 75yo RHD M () presents for RIGHT >> LEFT hand pain x 1 year. Pain localized to MPJ and IPJ of index, middle, ring, small fingers. Pain worst to ring finger. Warm water helps a little, especially in the morning. Denies numbness/tingling. Denies injury.\par Saw PCP => ordered XR of bilateral hand/wrist/finger at [facility] 9/7/22. Reports indicates "no acute fracture or dislocation. joint spaces of the hands and wrists are maintained, bilaterally, without appreciable degenerative change. no cortical erosions or periostitis to indicate inflammatory arthritis The right and left fourth digits are unremarkable. There are no destructive osseous lesions. No radiographic soft tissue abnormalities."\par Not currently taking any medication for this.\par \par Hx: CAD s/p stent. HTN. high cholesterol. [FreeTextEntry5] : NP 76 year old m present for f/u on right hand pain. MRI Results. states he is doing the same pain level is still constant and still at an 8 active and resting, no changes [de-identified] : MRI

## 2022-12-03 NOTE — ED ADULT TRIAGE NOTE - BP NONINVASIVE SYSTOLIC (MM HG)
Writer spoke with pt after the 6:15 pm AA meeting. Pt requested information on [employer] so she can find and attend [employer]An[employer] meetings and work toward repairing her marriage.Pt reported \"I need to get healthier so I can help myself too.\" Writer provided the information pt requested and encouraged pt to attend.     135

## 2022-12-14 ENCOUNTER — NON-APPOINTMENT (OUTPATIENT)
Age: 77
End: 2022-12-14

## 2022-12-14 ENCOUNTER — APPOINTMENT (OUTPATIENT)
Dept: HEART AND VASCULAR | Facility: CLINIC | Age: 77
End: 2022-12-14

## 2022-12-14 VITALS
BODY MASS INDEX: 20.44 KG/M2 | DIASTOLIC BLOOD PRESSURE: 66 MMHG | WEIGHT: 138 LBS | SYSTOLIC BLOOD PRESSURE: 128 MMHG | HEART RATE: 71 BPM | HEIGHT: 69 IN | TEMPERATURE: 97.8 F | OXYGEN SATURATION: 97 %

## 2022-12-14 DIAGNOSIS — Z01.810 ENCOUNTER FOR PREPROCEDURAL CARDIOVASCULAR EXAMINATION: ICD-10-CM

## 2022-12-14 PROCEDURE — 93000 ELECTROCARDIOGRAM COMPLETE: CPT | Mod: NC

## 2022-12-14 PROCEDURE — 99214 OFFICE O/P EST MOD 30 MIN: CPT | Mod: 25

## 2022-12-14 RX ORDER — MULTIVITAMIN
TABLET ORAL
Qty: 30 | Refills: 5 | Status: ACTIVE | COMMUNITY
Start: 2022-08-05

## 2022-12-15 NOTE — ASSESSMENT
[FreeTextEntry1] : rcri score is 1 he is optimized from a cardiac standpoint for low risk procedure \par hold omega 3 seven days prior\par surgery should be done on asa 81 hold losartan morning of take after surgery \par cad on losartan 1/2 tablet daily asa omega 3 and atorvastatin\par fu in six months

## 2022-12-15 NOTE — HISTORY OF PRESENT ILLNESS
[FreeTextEntry1] : 76 M COVID stent to Ramus 11/2020 in the settting of stable chest pain was on losartan in the past was stopped due to hypotension \par \par here for preoperative cardiac optimization prior to inguinal hernia repair feels great no further sob or cp able to perform greater than 4.6 METS \par \par \par ecg nsr 12/14/2022\par stress echo normal METS 8.2 1/2022

## 2022-12-15 NOTE — PHYSICAL EXAM
[Well Developed] : well developed [Well Nourished] : well nourished [No Acute Distress] : no acute distress [Normal Venous Pressure] : normal venous pressure [No Carotid Bruit] : no carotid bruit [Normal S1, S2] : normal S1, S2 [No Murmur] : no murmur [No Rub] : no rub [No Gallop] : no gallop [Clear Lung Fields] : clear lung fields [Good Air Entry] : good air entry [No Respiratory Distress] : no respiratory distress  [Soft] : abdomen soft [Non Tender] : non-tender [No Masses/organomegaly] : no masses/organomegaly [Normal Bowel Sounds] : normal bowel sounds [Normal Gait] : normal gait [No Edema] : no edema [No Cyanosis] : no cyanosis [No Clubbing] : no clubbing [No Varicosities] : no varicosities [No Rash] : no rash [No Skin Lesions] : no skin lesions [Moves all extremities] : moves all extremities [No Focal Deficits] : no focal deficits [Normal Speech] : normal speech [Alert and Oriented] : alert and oriented [Normal memory] : normal memory [General Appearance - Well Developed] : well developed [Normal Appearance] : normal appearance [Well Groomed] : well groomed [General Appearance - Well Nourished] : well nourished [No Deformities] : no deformities [General Appearance - In No Acute Distress] : no acute distress [Normal Conjunctiva] : the conjunctiva exhibited no abnormalities [Eyelids - No Xanthelasma] : the eyelids demonstrated no xanthelasmas [Normal Oral Mucosa] : normal oral mucosa [No Oral Pallor] : no oral pallor [No Oral Cyanosis] : no oral cyanosis [Normal Jugular Venous A Waves Present] : normal jugular venous A waves present [Normal Jugular Venous V Waves Present] : normal jugular venous V waves present [No Jugular Venous Quiñones A Waves] : no jugular venous quiñones A waves [Exaggerated Use Of Accessory Muscles For Inspiration] : no accessory muscle use [Respiration, Rhythm And Depth] : normal respiratory rhythm and effort [Auscultation Breath Sounds / Voice Sounds] : lungs were clear to auscultation bilaterally [Heart Rate And Rhythm] : heart rate and rhythm were normal [Murmurs] : no murmurs present [Heart Sounds] : normal S1 and S2 [Abdomen Soft] : soft [Abdomen Tenderness] : non-tender [Abdomen Mass (___ Cm)] : no abdominal mass palpated [Abnormal Walk] : normal gait [Gait - Sufficient For Exercise Testing] : the gait was sufficient for exercise testing [Nail Clubbing] : no clubbing of the fingernails [Cyanosis, Localized] : no localized cyanosis [Petechial Hemorrhages (___cm)] : no petechial hemorrhages [Skin Color & Pigmentation] : normal skin color and pigmentation [] : no rash [No Venous Stasis] : no venous stasis [Skin Lesions] : no skin lesions [No Skin Ulcers] : no skin ulcer [No Xanthoma] : no  xanthoma was observed [Oriented To Time, Place, And Person] : oriented to person, place, and time [Affect] : the affect was normal [Mood] : the mood was normal [No Anxiety] : not feeling anxious

## 2023-02-07 ENCOUNTER — APPOINTMENT (OUTPATIENT)
Dept: HEART AND VASCULAR | Facility: CLINIC | Age: 78
End: 2023-02-07

## 2023-07-24 ENCOUNTER — RX RENEWAL (OUTPATIENT)
Age: 78
End: 2023-07-24

## 2023-07-24 RX ORDER — ROSUVASTATIN CALCIUM 40 MG/1
40 TABLET, FILM COATED ORAL
Qty: 90 | Refills: 3 | Status: ACTIVE | COMMUNITY
Start: 2021-03-17 | End: 1900-01-01

## 2023-08-09 ENCOUNTER — APPOINTMENT (OUTPATIENT)
Dept: HEART AND VASCULAR | Facility: CLINIC | Age: 78
End: 2023-08-09
Payer: MEDICARE

## 2023-08-09 ENCOUNTER — NON-APPOINTMENT (OUTPATIENT)
Age: 78
End: 2023-08-09

## 2023-08-09 VITALS
HEIGHT: 69 IN | HEART RATE: 92 BPM | TEMPERATURE: 98.5 F | WEIGHT: 134.99 LBS | BODY MASS INDEX: 19.99 KG/M2 | OXYGEN SATURATION: 98 %

## 2023-08-09 VITALS — DIASTOLIC BLOOD PRESSURE: 83 MMHG | SYSTOLIC BLOOD PRESSURE: 128 MMHG

## 2023-08-09 PROCEDURE — 36415 COLL VENOUS BLD VENIPUNCTURE: CPT

## 2023-08-09 PROCEDURE — 93000 ELECTROCARDIOGRAM COMPLETE: CPT

## 2023-08-09 PROCEDURE — 99214 OFFICE O/P EST MOD 30 MIN: CPT | Mod: 25

## 2023-08-09 RX ORDER — LOSARTAN POTASSIUM 25 MG/1
25 TABLET, FILM COATED ORAL
Qty: 45 | Refills: 2 | Status: ACTIVE | COMMUNITY
Start: 2021-12-08

## 2023-08-09 NOTE — ASSESSMENT
[FreeTextEntry1] : cad on asa 81 mg daily, losartan 25 mg daily omega 3 bid and rosuvastatin 40 mg daily check labs  fu in six months

## 2023-08-09 NOTE — PHYSICAL EXAM
[Well Developed] : well developed [Well Nourished] : well nourished [No Acute Distress] : no acute distress [Normal Venous Pressure] : normal venous pressure [No Carotid Bruit] : no carotid bruit [Normal S1, S2] : normal S1, S2 [No Murmur] : no murmur [No Rub] : no rub [No Gallop] : no gallop [Clear Lung Fields] : clear lung fields [Good Air Entry] : good air entry [No Respiratory Distress] : no respiratory distress  [Soft] : abdomen soft [Non Tender] : non-tender [No Masses/organomegaly] : no masses/organomegaly [Normal Bowel Sounds] : normal bowel sounds [Normal Gait] : normal gait [No Edema] : no edema [No Cyanosis] : no cyanosis [No Clubbing] : no clubbing [No Varicosities] : no varicosities [No Rash] : no rash [No Skin Lesions] : no skin lesions [Moves all extremities] : moves all extremities [No Focal Deficits] : no focal deficits [Normal Speech] : normal speech [Alert and Oriented] : alert and oriented [Normal memory] : normal memory [General Appearance - Well Developed] : well developed [Normal Appearance] : normal appearance [Well Groomed] : well groomed [General Appearance - Well Nourished] : well nourished [No Deformities] : no deformities [General Appearance - In No Acute Distress] : no acute distress [Normal Conjunctiva] : the conjunctiva exhibited no abnormalities [Eyelids - No Xanthelasma] : the eyelids demonstrated no xanthelasmas [Normal Oral Mucosa] : normal oral mucosa [No Oral Pallor] : no oral pallor [No Oral Cyanosis] : no oral cyanosis [Normal Jugular Venous A Waves Present] : normal jugular venous A waves present [Normal Jugular Venous V Waves Present] : normal jugular venous V waves present [No Jugular Venous Quiñones A Waves] : no jugular venous quiñones A waves [Respiration, Rhythm And Depth] : normal respiratory rhythm and effort [Exaggerated Use Of Accessory Muscles For Inspiration] : no accessory muscle use [Auscultation Breath Sounds / Voice Sounds] : lungs were clear to auscultation bilaterally [Heart Rate And Rhythm] : heart rate and rhythm were normal [Heart Sounds] : normal S1 and S2 [Murmurs] : no murmurs present [Abdomen Soft] : soft [Abdomen Tenderness] : non-tender [Abdomen Mass (___ Cm)] : no abdominal mass palpated [Abnormal Walk] : normal gait [Gait - Sufficient For Exercise Testing] : the gait was sufficient for exercise testing [Nail Clubbing] : no clubbing of the fingernails [Petechial Hemorrhages (___cm)] : no petechial hemorrhages [Cyanosis, Localized] : no localized cyanosis [Skin Color & Pigmentation] : normal skin color and pigmentation [] : no rash [No Venous Stasis] : no venous stasis [Skin Lesions] : no skin lesions [No Skin Ulcers] : no skin ulcer [No Xanthoma] : no  xanthoma was observed [Oriented To Time, Place, And Person] : oriented to person, place, and time [Mood] : the mood was normal [Affect] : the affect was normal [No Anxiety] : not feeling anxious

## 2023-08-09 NOTE — HISTORY OF PRESENT ILLNESS
[FreeTextEntry1] : 77 M COVID stent to Ramus 11/2020 in the settting of stable chest pain   here for fu he feels great he is tolerating losartan 25 mg daily    ecg nsr 8/9/2023 stress echo normal METS 8.2 1/2022

## 2023-08-10 LAB
ALBUMIN SERPL ELPH-MCNC: 4.5 G/DL
ALP BLD-CCNC: 72 U/L
ALT SERPL-CCNC: 29 U/L
ANION GAP SERPL CALC-SCNC: 10 MMOL/L
AST SERPL-CCNC: 25 U/L
BILIRUB SERPL-MCNC: 0.9 MG/DL
BUN SERPL-MCNC: 15 MG/DL
CALCIUM SERPL-MCNC: 9.3 MG/DL
CHLORIDE SERPL-SCNC: 107 MMOL/L
CHOLEST SERPL-MCNC: 132 MG/DL
CO2 SERPL-SCNC: 23 MMOL/L
CREAT SERPL-MCNC: 0.84 MG/DL
CREAT SPEC-SCNC: 39 MG/DL
EGFR: 91 ML/MIN/1.73M2
ESTIMATED AVERAGE GLUCOSE: 108 MG/DL
GLUCOSE SERPL-MCNC: 110 MG/DL
HBA1C MFR BLD HPLC: 5.4 %
HDLC SERPL-MCNC: 74 MG/DL
LDLC SERPL CALC-MCNC: 35 MG/DL
MICROALBUMIN 24H UR DL<=1MG/L-MCNC: <1.2 MG/DL
MICROALBUMIN/CREAT 24H UR-RTO: NORMAL MG/G
NONHDLC SERPL-MCNC: 58 MG/DL
NT-PROBNP SERPL-MCNC: <36 PG/ML
POTASSIUM SERPL-SCNC: 4.7 MMOL/L
PROT SERPL-MCNC: 6.7 G/DL
SODIUM SERPL-SCNC: 141 MMOL/L
T4 FREE SERPL-MCNC: 1.5 NG/DL
TRIGL SERPL-MCNC: 136 MG/DL
TSH SERPL-ACNC: 1.59 UIU/ML

## 2023-12-19 ENCOUNTER — NON-APPOINTMENT (OUTPATIENT)
Age: 78
End: 2023-12-19

## 2023-12-19 ENCOUNTER — APPOINTMENT (OUTPATIENT)
Dept: HEART AND VASCULAR | Facility: CLINIC | Age: 78
End: 2023-12-19
Payer: MEDICARE

## 2023-12-19 VITALS
TEMPERATURE: 98.1 F | HEIGHT: 69 IN | WEIGHT: 137 LBS | HEART RATE: 77 BPM | DIASTOLIC BLOOD PRESSURE: 72 MMHG | OXYGEN SATURATION: 97 % | BODY MASS INDEX: 20.29 KG/M2 | SYSTOLIC BLOOD PRESSURE: 126 MMHG

## 2023-12-19 DIAGNOSIS — I25.10 ATHEROSCLEROTIC HEART DISEASE OF NATIVE CORONARY ARTERY W/OUT ANGINA PECTORIS: ICD-10-CM

## 2023-12-19 PROCEDURE — 99213 OFFICE O/P EST LOW 20 MIN: CPT | Mod: 25

## 2023-12-19 PROCEDURE — 93000 ELECTROCARDIOGRAM COMPLETE: CPT

## 2023-12-20 PROBLEM — I25.10 CAD (CORONARY ARTERY DISEASE): Status: ACTIVE | Noted: 2020-11-11

## 2023-12-20 NOTE — ASSESSMENT
[FreeTextEntry1] : chest pressure -vague sxs, stress echo negative, will procedure with nuclear stress test  cad on asa 81 mg daily, losartan 25 mg daily omega 3 bid and rosuvastatin 40 mg daily fu after testing

## 2023-12-20 NOTE — HISTORY OF PRESENT ILLNESS
[FreeTextEntry1] : 77 M COVID stent to Ramus 11/2020 in the settting of stable chest pain   feeling well overall. occasionally gets a "twinge" of chest pain with exercise. feels it in his back and center chest. does not feel similar to when he got his stent. no associated SOB. has been walking and staying active.   ecg nsr 12/19/2023 stress echo normal METS 8.2 1/2022

## 2024-01-16 ENCOUNTER — APPOINTMENT (OUTPATIENT)
Dept: HEART AND VASCULAR | Facility: CLINIC | Age: 79
End: 2024-01-16

## 2024-01-26 ENCOUNTER — NON-APPOINTMENT (OUTPATIENT)
Age: 79
End: 2024-01-26

## 2024-02-13 NOTE — ED PROVIDER NOTE - CONSTITUTIONAL, MLM
For the URI we will continue with symptomatic care.  Suspect viral etiology. do suspect the symptoms may persist for 1-2 weeks. Return to clinic if worsening breathing, worsening fevers, or persists for more than a week without improvement.  Otherwise RTC for regularly scheduled PE/ Well exam.  Did send a PCR for influenza A and B and coronavirus.  Rapid RSV done here in the office is negative.   normal... Well appearing, awake, alert, oriented to person, place, time/situation and in no apparent distress.

## 2024-05-08 ENCOUNTER — NON-APPOINTMENT (OUTPATIENT)
Age: 79
End: 2024-05-08

## 2024-05-08 ENCOUNTER — APPOINTMENT (OUTPATIENT)
Dept: HEART AND VASCULAR | Facility: CLINIC | Age: 79
End: 2024-05-08
Payer: MEDICARE

## 2024-05-08 VITALS
SYSTOLIC BLOOD PRESSURE: 124 MMHG | BODY MASS INDEX: 20.59 KG/M2 | OXYGEN SATURATION: 97 % | HEIGHT: 69 IN | TEMPERATURE: 98 F | HEART RATE: 81 BPM | WEIGHT: 139 LBS | DIASTOLIC BLOOD PRESSURE: 68 MMHG

## 2024-05-08 DIAGNOSIS — R00.2 PALPITATIONS: ICD-10-CM

## 2024-05-08 DIAGNOSIS — R07.9 CHEST PAIN, UNSPECIFIED: ICD-10-CM

## 2024-05-08 PROCEDURE — 36415 COLL VENOUS BLD VENIPUNCTURE: CPT

## 2024-05-08 PROCEDURE — 93246 EXT ECG>7D<15D RECORDING: CPT

## 2024-05-08 PROCEDURE — G2211 COMPLEX E/M VISIT ADD ON: CPT

## 2024-05-08 PROCEDURE — 93000 ELECTROCARDIOGRAM COMPLETE: CPT

## 2024-05-08 PROCEDURE — 99214 OFFICE O/P EST MOD 30 MIN: CPT

## 2024-05-08 NOTE — ASSESSMENT
[FreeTextEntry1] : cad on asa 81 mg daily, omega 3 bid and rosuvastatin 40 mg daily no further sob he is doing well on losartan 25 mg daily nuclear stress test and echo  holter for palpitations check labs  fu in six months

## 2024-05-08 NOTE — HISTORY OF PRESENT ILLNESS
[FreeTextEntry1] : 77 M COVID stent to Ramus 11/2020 in the setting of stable chest pain Hernia Repair 1/2024   having fluttering in his chest pain a few times a week has some heaviness in his chest he has heaviness when he bends over not really when he walks. he is not really active.    ecg nsr  5/8/24 stress echo normal METS 8.2 1/2022

## 2024-05-09 LAB
ALBUMIN SERPL ELPH-MCNC: 4.5 G/DL
ALP BLD-CCNC: 72 U/L
ALT SERPL-CCNC: 28 U/L
ANION GAP SERPL CALC-SCNC: 11 MMOL/L
AST SERPL-CCNC: 26 U/L
BILIRUB SERPL-MCNC: 0.9 MG/DL
BUN SERPL-MCNC: 18 MG/DL
CALCIUM SERPL-MCNC: 9.4 MG/DL
CHLORIDE SERPL-SCNC: 108 MMOL/L
CHOLEST SERPL-MCNC: 136 MG/DL
CO2 SERPL-SCNC: 24 MMOL/L
CREAT SERPL-MCNC: 0.92 MG/DL
CREAT SPEC-SCNC: 154 MG/DL
EGFR: 86 ML/MIN/1.73M2
ESTIMATED AVERAGE GLUCOSE: 105 MG/DL
GLUCOSE SERPL-MCNC: 105 MG/DL
HBA1C MFR BLD HPLC: 5.3 %
HCT VFR BLD CALC: 43.4 %
HDLC SERPL-MCNC: 66 MG/DL
HGB BLD-MCNC: 14.3 G/DL
LDLC SERPL CALC-MCNC: 41 MG/DL
MCHC RBC-ENTMCNC: 30.2 PG
MCHC RBC-ENTMCNC: 32.9 GM/DL
MCV RBC AUTO: 91.8 FL
MICROALBUMIN 24H UR DL<=1MG/L-MCNC: <1.2 MG/DL
MICROALBUMIN/CREAT 24H UR-RTO: NORMAL MG/G
NONHDLC SERPL-MCNC: 70 MG/DL
PLATELET # BLD AUTO: 166 K/UL
POTASSIUM SERPL-SCNC: 4.6 MMOL/L
PROT SERPL-MCNC: 6.9 G/DL
RBC # BLD: 4.73 M/UL
RBC # FLD: 14.2 %
SODIUM SERPL-SCNC: 142 MMOL/L
T4 FREE SERPL-MCNC: 1.4 NG/DL
TRIGL SERPL-MCNC: 177 MG/DL
TSH SERPL-ACNC: 1.4 UIU/ML
WBC # FLD AUTO: 6.11 K/UL

## 2024-05-21 ENCOUNTER — APPOINTMENT (OUTPATIENT)
Dept: HEART AND VASCULAR | Facility: CLINIC | Age: 79
End: 2024-05-21

## 2024-05-27 PROCEDURE — 93248 EXT ECG>7D<15D REV&INTERPJ: CPT

## 2024-06-06 ENCOUNTER — APPOINTMENT (OUTPATIENT)
Dept: HEART AND VASCULAR | Facility: CLINIC | Age: 79
End: 2024-06-06
Payer: MEDICARE

## 2024-06-06 PROCEDURE — 93306 TTE W/DOPPLER COMPLETE: CPT

## 2024-06-06 PROCEDURE — A9500: CPT

## 2024-06-06 PROCEDURE — 93015 CV STRESS TEST SUPVJ I&R: CPT

## 2024-06-06 PROCEDURE — 78452 HT MUSCLE IMAGE SPECT MULT: CPT

## 2024-10-14 ENCOUNTER — NON-APPOINTMENT (OUTPATIENT)
Age: 79
End: 2024-10-14

## 2024-10-14 ENCOUNTER — APPOINTMENT (OUTPATIENT)
Dept: HEART AND VASCULAR | Facility: CLINIC | Age: 79
End: 2024-10-14
Payer: MEDICARE

## 2024-10-14 VITALS
WEIGHT: 140.99 LBS | TEMPERATURE: 98 F | BODY MASS INDEX: 20.88 KG/M2 | HEIGHT: 69 IN | SYSTOLIC BLOOD PRESSURE: 150 MMHG | DIASTOLIC BLOOD PRESSURE: 72 MMHG | OXYGEN SATURATION: 99 % | HEART RATE: 80 BPM

## 2024-10-14 DIAGNOSIS — R09.89 OTHER SPECIFIED SYMPTOMS AND SIGNS INVOLVING THE CIRCULATORY AND RESPIRATORY SYSTEMS: ICD-10-CM

## 2024-10-14 DIAGNOSIS — M48.00 SPINAL STENOSIS, SITE UNSPECIFIED: ICD-10-CM

## 2024-10-14 PROCEDURE — 93000 ELECTROCARDIOGRAM COMPLETE: CPT

## 2024-10-14 PROCEDURE — 99214 OFFICE O/P EST MOD 30 MIN: CPT | Mod: 25

## 2024-10-15 LAB
FREE KAPPA URINE: 4.97 MG/L
FREE KAPPA/LAMDA RATIO: NORMAL
FREE LAMDA URINE: <0.74 MG/L

## 2024-10-17 LAB — M PROTEIN SPEC IFE-MCNC: NORMAL

## 2024-11-06 ENCOUNTER — NON-APPOINTMENT (OUTPATIENT)
Age: 79
End: 2024-11-06

## 2024-11-06 ENCOUNTER — APPOINTMENT (OUTPATIENT)
Dept: HEART AND VASCULAR | Facility: CLINIC | Age: 79
End: 2024-11-06
Payer: MEDICARE

## 2024-11-06 VITALS
TEMPERATURE: 97.9 F | BODY MASS INDEX: 20.88 KG/M2 | HEART RATE: 75 BPM | OXYGEN SATURATION: 97 % | WEIGHT: 140.99 LBS | HEIGHT: 69 IN | DIASTOLIC BLOOD PRESSURE: 76 MMHG | SYSTOLIC BLOOD PRESSURE: 126 MMHG

## 2024-11-06 DIAGNOSIS — I25.10 ATHEROSCLEROTIC HEART DISEASE OF NATIVE CORONARY ARTERY W/OUT ANGINA PECTORIS: ICD-10-CM

## 2024-11-06 PROCEDURE — G2211 COMPLEX E/M VISIT ADD ON: CPT

## 2024-11-06 PROCEDURE — 93000 ELECTROCARDIOGRAM COMPLETE: CPT

## 2024-11-06 PROCEDURE — 36415 COLL VENOUS BLD VENIPUNCTURE: CPT

## 2024-11-06 PROCEDURE — 99214 OFFICE O/P EST MOD 30 MIN: CPT

## 2024-11-07 LAB
ALBUMIN SERPL ELPH-MCNC: 4.3 G/DL
ALP BLD-CCNC: 60 U/L
ALT SERPL-CCNC: 23 U/L
ANION GAP SERPL CALC-SCNC: 11 MMOL/L
AST SERPL-CCNC: 22 U/L
BILIRUB SERPL-MCNC: 0.8 MG/DL
BUN SERPL-MCNC: 17 MG/DL
CALCIUM SERPL-MCNC: 9.6 MG/DL
CHLORIDE SERPL-SCNC: 107 MMOL/L
CO2 SERPL-SCNC: 24 MMOL/L
CREAT SERPL-MCNC: 0.94 MG/DL
EGFR: 84 ML/MIN/1.73M2
GLUCOSE SERPL-MCNC: 106 MG/DL
POTASSIUM SERPL-SCNC: 4.8 MMOL/L
PROT SERPL-MCNC: 6.6 G/DL
SODIUM SERPL-SCNC: 142 MMOL/L

## 2025-05-09 ENCOUNTER — NON-APPOINTMENT (OUTPATIENT)
Age: 80
End: 2025-05-09

## 2025-05-09 ENCOUNTER — APPOINTMENT (OUTPATIENT)
Dept: HEART AND VASCULAR | Facility: CLINIC | Age: 80
End: 2025-05-09
Payer: MEDICARE

## 2025-05-09 VITALS
DIASTOLIC BLOOD PRESSURE: 70 MMHG | TEMPERATURE: 98 F | HEART RATE: 76 BPM | SYSTOLIC BLOOD PRESSURE: 118 MMHG | WEIGHT: 138 LBS | OXYGEN SATURATION: 96 % | HEIGHT: 69 IN | BODY MASS INDEX: 20.44 KG/M2

## 2025-05-09 DIAGNOSIS — I25.10 ATHEROSCLEROTIC HEART DISEASE OF NATIVE CORONARY ARTERY W/OUT ANGINA PECTORIS: ICD-10-CM

## 2025-05-09 PROCEDURE — 99214 OFFICE O/P EST MOD 30 MIN: CPT

## 2025-05-09 PROCEDURE — 36415 COLL VENOUS BLD VENIPUNCTURE: CPT

## 2025-05-09 PROCEDURE — 93000 ELECTROCARDIOGRAM COMPLETE: CPT

## 2025-05-09 PROCEDURE — G2211 COMPLEX E/M VISIT ADD ON: CPT

## 2025-08-08 ENCOUNTER — APPOINTMENT (OUTPATIENT)
Dept: HEART AND VASCULAR | Facility: CLINIC | Age: 80
End: 2025-08-08
Payer: MEDICARE

## 2025-08-08 VITALS
HEIGHT: 69 IN | SYSTOLIC BLOOD PRESSURE: 122 MMHG | OXYGEN SATURATION: 97 % | HEART RATE: 76 BPM | WEIGHT: 135 LBS | TEMPERATURE: 98 F | DIASTOLIC BLOOD PRESSURE: 70 MMHG | BODY MASS INDEX: 19.99 KG/M2

## 2025-08-08 DIAGNOSIS — R06.02 SHORTNESS OF BREATH: ICD-10-CM

## 2025-08-08 PROCEDURE — G2211 COMPLEX E/M VISIT ADD ON: CPT

## 2025-08-08 PROCEDURE — 99214 OFFICE O/P EST MOD 30 MIN: CPT

## 2025-08-08 PROCEDURE — 93000 ELECTROCARDIOGRAM COMPLETE: CPT

## 2025-08-08 PROCEDURE — 36415 COLL VENOUS BLD VENIPUNCTURE: CPT

## 2025-08-10 LAB
ALBUMIN SERPL ELPH-MCNC: 4.4 G/DL
ALBUMIN, RANDOM URINE: <1.2 MG/DL
ALP BLD-CCNC: 67 U/L
ALT SERPL-CCNC: 29 U/L
ANION GAP SERPL CALC-SCNC: 12 MMOL/L
AST SERPL-CCNC: 26 U/L
BILIRUB SERPL-MCNC: 0.9 MG/DL
BUN SERPL-MCNC: 18 MG/DL
CALCIUM SERPL-MCNC: 9.4 MG/DL
CHLORIDE SERPL-SCNC: 106 MMOL/L
CHOLEST SERPL-MCNC: 138 MG/DL
CO2 SERPL-SCNC: 22 MMOL/L
CREAT SERPL-MCNC: 1 MG/DL
CREAT SPEC-SCNC: 135 MG/DL
EGFRCR SERPLBLD CKD-EPI 2021: 78 ML/MIN/1.73M2
ESTIMATED AVERAGE GLUCOSE: 108 MG/DL
GLUCOSE SERPL-MCNC: 108 MG/DL
HBA1C MFR BLD HPLC: 5.4 %
HDLC SERPL-MCNC: 77 MG/DL
LDLC SERPL-MCNC: 43 MG/DL
MICROALBUMIN/CREAT 24H UR-RTO: NORMAL MG/G
NONHDLC SERPL-MCNC: 62 MG/DL
NT-PROBNP SERPL-MCNC: 51 PG/ML
POTASSIUM SERPL-SCNC: 5.1 MMOL/L
PROT SERPL-MCNC: 6.9 G/DL
SODIUM SERPL-SCNC: 140 MMOL/L
TRIGL SERPL-MCNC: 109 MG/DL